# Patient Record
Sex: MALE | Race: WHITE | Employment: FULL TIME | URBAN - METROPOLITAN AREA
[De-identification: names, ages, dates, MRNs, and addresses within clinical notes are randomized per-mention and may not be internally consistent; named-entity substitution may affect disease eponyms.]

---

## 2018-02-12 PROBLEM — M48.02 CERVICAL STENOSIS OF SPINAL CANAL: Status: ACTIVE | Noted: 2018-02-12

## 2018-03-09 PROBLEM — M54.12 CERVICAL RADICULOPATHY: Status: ACTIVE | Noted: 2018-03-09

## 2018-03-09 PROBLEM — M54.2 NECK PAIN: Status: ACTIVE | Noted: 2018-03-09

## 2018-03-09 PROBLEM — M47.812 CERVICAL FACET JOINT SYNDROME: Status: ACTIVE | Noted: 2018-03-09

## 2018-03-09 PROBLEM — M50.30 DDD (DEGENERATIVE DISC DISEASE), CERVICAL: Status: ACTIVE | Noted: 2018-03-09

## 2018-03-12 ENCOUNTER — PRE-PROCEDURE TELEPHONE (OUTPATIENT)
Dept: OPERATING ROOM | Age: 58
End: 2018-03-12

## 2018-03-12 ENCOUNTER — HOSPITAL ENCOUNTER (OUTPATIENT)
Age: 58
Setting detail: OUTPATIENT SURGERY
Discharge: HOME | End: 2018-03-12
Attending: PAIN MEDICINE | Admitting: PAIN MEDICINE
Payer: COMMERCIAL

## 2018-03-12 ENCOUNTER — HOSPITAL ENCOUNTER (OUTPATIENT)
Dept: GENERAL RADIOLOGY | Age: 58
Discharge: HOME | End: 2018-03-14
Payer: COMMERCIAL

## 2018-03-12 VITALS
OXYGEN SATURATION: 95 % | RESPIRATION RATE: 16 BRPM | HEART RATE: 72 BPM | DIASTOLIC BLOOD PRESSURE: 88 MMHG | SYSTOLIC BLOOD PRESSURE: 140 MMHG

## 2018-03-12 DIAGNOSIS — M54.12 RIGHT CERVICAL RADICULOPATHY: ICD-10-CM

## 2018-03-12 PROCEDURE — 62321 NJX INTERLAMINAR CRV/THRC: CPT | Performed by: PAIN MEDICINE

## 2018-03-12 PROCEDURE — 2500000003 HC RX 250 WO HCPCS: Performed by: PAIN MEDICINE

## 2018-03-12 PROCEDURE — 77003 FLUOROGUIDE FOR SPINE INJECT: CPT

## 2018-03-12 PROCEDURE — 7100000010 HC PHASE II RECOVERY - FIRST 15 MIN: Performed by: PAIN MEDICINE

## 2018-03-12 PROCEDURE — 3600000005 HC SURGERY LEVEL 5 BASE: Performed by: PAIN MEDICINE

## 2018-03-12 PROCEDURE — 6360000002 HC RX W HCPCS: Performed by: PAIN MEDICINE

## 2018-03-12 PROCEDURE — 7100000011 HC PHASE II RECOVERY - ADDTL 15 MIN: Performed by: PAIN MEDICINE

## 2018-03-12 RX ORDER — LIDOCAINE HYDROCHLORIDE 5 MG/ML
INJECTION, SOLUTION INFILTRATION; INTRAVENOUS PRN
Status: DISCONTINUED | OUTPATIENT
Start: 2018-03-12 | End: 2018-03-12 | Stop reason: HOSPADM

## 2018-03-12 RX ORDER — METHYLPREDNISOLONE ACETATE 40 MG/ML
INJECTION, SUSPENSION INTRA-ARTICULAR; INTRALESIONAL; INTRAMUSCULAR; SOFT TISSUE PRN
Status: DISCONTINUED | OUTPATIENT
Start: 2018-03-12 | End: 2018-03-12 | Stop reason: HOSPADM

## 2018-03-12 ASSESSMENT — PAIN - FUNCTIONAL ASSESSMENT: PAIN_FUNCTIONAL_ASSESSMENT: 0-10

## 2018-03-12 ASSESSMENT — PAIN DESCRIPTION - DESCRIPTORS
DESCRIPTORS: ACHING

## 2018-03-12 ASSESSMENT — PAIN DESCRIPTION - PAIN TYPE
TYPE: CHRONIC PAIN
TYPE: CHRONIC PAIN

## 2018-03-12 ASSESSMENT — PAIN DESCRIPTION - LOCATION
LOCATION: BACK;NECK
LOCATION: BACK;NECK

## 2018-03-12 ASSESSMENT — PAIN SCALES - GENERAL
PAINLEVEL_OUTOF10: 4
PAINLEVEL_OUTOF10: 6

## 2018-03-12 NOTE — OP NOTE
fluoroscopy. Once in the epidural space , negative aspiration for blood and CSF was confirmed . Needle tip placement was confirmed by visualizing epidural spread of 0.5 ml of omnipaque 240 visualized in both AP and lateral live fluoroscopic views. Then after negative aspiration, a solution of 0.5 % Lidocaine 3 ml and 40 mg DepoMedrol was easily injected. The needle was gently removed intact. The patient neck was cleaned and a Band-Aid was placed over the needle insertion point. Disposition the patient tolerated the procedure well and there were no complications . Vital signs remained stable throughout the procedure. The patient was escorted to the recovery area where they remained until discharge and written discharge instructions for the procedure were given. Plan: Chandu Carter will return to our pain management center as scheduled.      Sandra Jackson MD

## 2018-03-12 NOTE — OP NOTE
The clinician documented this note on the incorrect encounter. This information has been reviewed and the note has been moved to the correct visit. Please contact local medical records if there are any questions.

## 2018-03-16 ENCOUNTER — TREATMENT (OUTPATIENT)
Dept: PHYSICAL THERAPY | Age: 58
End: 2018-03-16
Payer: COMMERCIAL

## 2018-03-16 DIAGNOSIS — M48.02 CERVICAL STENOSIS OF SPINAL CANAL: Primary | ICD-10-CM

## 2018-03-16 PROCEDURE — G0283 ELEC STIM OTHER THAN WOUND: HCPCS

## 2018-03-16 NOTE — PROGRESS NOTES
Physical Therapy Daily Treatment Note    Date: 3/16/2018  Patient Name: Nickolas Aranda  : 1960   MRN: [id number]  DOInjury: ~2017  DIAGNOSIS: Cervical stenosis of spinal canal   REFERRING PROVIDER: Norah Wood DO    S: Had a epidural on 3/12/18. Feeling about 70-75% improvement. O:    Time 7895-0280    Visit 10/18 G CODE EVERY 10 VISITS   Pain 10/10 Current:    Goal:  CI   ROM     Modalities     MH + IFC Seated x 20 min    IFC  UT area   Cervical traction      Exercise     ROWS: H     ROWS: M     ROWS: L     Shoulder ER     Shoulder IR     Shrugs                         A:  Tolerated well.      P: Continue with rehab plan  [place] [place] [address], PTA

## 2018-03-19 ENCOUNTER — TREATMENT (OUTPATIENT)
Dept: PHYSICAL THERAPY | Age: 58
End: 2018-03-19
Payer: COMMERCIAL

## 2018-03-19 DIAGNOSIS — M48.02 CERVICAL STENOSIS OF SPINAL CANAL: Primary | ICD-10-CM

## 2018-03-19 PROCEDURE — G0283 ELEC STIM OTHER THAN WOUND: HCPCS

## 2018-03-19 PROCEDURE — 97110 THERAPEUTIC EXERCISES: CPT

## 2018-12-19 ENCOUNTER — APPOINTMENT (OUTPATIENT)
Dept: GENERAL RADIOLOGY | Age: 58
End: 2018-12-19
Payer: COMMERCIAL

## 2018-12-19 ENCOUNTER — HOSPITAL ENCOUNTER (EMERGENCY)
Age: 58
Discharge: HOME | End: 2018-12-19
Payer: COMMERCIAL

## 2018-12-19 VITALS
WEIGHT: 232 LBS | TEMPERATURE: 98.7 F | OXYGEN SATURATION: 97 % | SYSTOLIC BLOOD PRESSURE: 159 MMHG | HEART RATE: 80 BPM | BODY MASS INDEX: 28.24 KG/M2 | RESPIRATION RATE: 16 BRPM | DIASTOLIC BLOOD PRESSURE: 53 MMHG

## 2018-12-19 DIAGNOSIS — J06.9 ACUTE UPPER RESPIRATORY INFECTION: Primary | ICD-10-CM

## 2018-12-19 DIAGNOSIS — J45.909 UNCOMPLICATED ASTHMA, UNSPECIFIED ASTHMA SEVERITY, UNSPECIFIED WHETHER PERSISTENT: ICD-10-CM

## 2018-12-19 PROCEDURE — 99212 OFFICE O/P EST SF 10 MIN: CPT

## 2018-12-19 PROCEDURE — 71046 X-RAY EXAM CHEST 2 VIEWS: CPT

## 2018-12-19 RX ORDER — AZITHROMYCIN 250 MG/1
TABLET, FILM COATED ORAL
Qty: 6 TABLET | Refills: 0 | Status: SHIPPED | OUTPATIENT
Start: 2018-12-19 | End: 2018-12-29

## 2018-12-19 ASSESSMENT — PAIN DESCRIPTION - LOCATION: LOCATION: FACE

## 2018-12-19 ASSESSMENT — PAIN DESCRIPTION - DESCRIPTORS: DESCRIPTORS: PRESSURE

## 2018-12-19 ASSESSMENT — PAIN SCALES - GENERAL: PAINLEVEL_OUTOF10: 7

## 2019-03-18 ENCOUNTER — HOSPITAL ENCOUNTER (EMERGENCY)
Age: 59
Discharge: HOME | End: 2019-03-18
Attending: EMERGENCY MEDICINE
Payer: COMMERCIAL

## 2019-03-18 ENCOUNTER — APPOINTMENT (OUTPATIENT)
Dept: GENERAL RADIOLOGY | Age: 59
End: 2019-03-18
Payer: COMMERCIAL

## 2019-03-18 VITALS
HEART RATE: 114 BPM | DIASTOLIC BLOOD PRESSURE: 72 MMHG | HEIGHT: 76 IN | OXYGEN SATURATION: 96 % | SYSTOLIC BLOOD PRESSURE: 121 MMHG | RESPIRATION RATE: 20 BRPM | TEMPERATURE: 98.6 F | BODY MASS INDEX: 29.59 KG/M2 | WEIGHT: 243 LBS

## 2019-03-18 DIAGNOSIS — J45.909 UNCOMPLICATED ASTHMA, UNSPECIFIED ASTHMA SEVERITY, UNSPECIFIED WHETHER PERSISTENT: ICD-10-CM

## 2019-03-18 DIAGNOSIS — J10.1 INFLUENZA A: Primary | ICD-10-CM

## 2019-03-18 DIAGNOSIS — R05.9 COUGH: ICD-10-CM

## 2019-03-18 LAB
ALBUMIN SERPL-MCNC: 4 G/DL (ref 3.5–5.2)
ALP BLD-CCNC: 91 U/L (ref 40–129)
ALT SERPL-CCNC: 24 U/L (ref 0–40)
ANION GAP SERPL CALCULATED.3IONS-SCNC: 12 MMOL/L (ref 7–16)
AST SERPL-CCNC: 20 U/L (ref 0–39)
BASOPHILS ABSOLUTE: 0.05 E9/L (ref 0–0.2)
BASOPHILS RELATIVE PERCENT: 0.4 % (ref 0–2)
BILIRUB SERPL-MCNC: 0.7 MG/DL (ref 0–1.2)
BUN BLDV-MCNC: 10 MG/DL (ref 6–20)
CALCIUM SERPL-MCNC: 9.3 MG/DL (ref 8.6–10.2)
CHLORIDE BLD-SCNC: 98 MMOL/L (ref 98–107)
CO2: 27 MMOL/L (ref 22–29)
CREAT SERPL-MCNC: 0.9 MG/DL (ref 0.7–1.2)
EOSINOPHILS ABSOLUTE: 0.24 E9/L (ref 0.05–0.5)
EOSINOPHILS RELATIVE PERCENT: 1.9 % (ref 0–6)
GFR AFRICAN AMERICAN: >60
GFR NON-AFRICAN AMERICAN: >60 ML/MIN/1.73
GLUCOSE BLD-MCNC: 108 MG/DL (ref 74–99)
HCT VFR BLD CALC: 52.2 % (ref 37–54)
HEMOGLOBIN: 17.1 G/DL (ref 12.5–16.5)
IMMATURE GRANULOCYTES #: 0.18 E9/L
IMMATURE GRANULOCYTES %: 1.4 % (ref 0–5)
INFLUENZA A BY PCR: DETECTED
INFLUENZA B BY PCR: NOT DETECTED
LACTIC ACID: 1.8 MMOL/L (ref 0.5–2.2)
LYMPHOCYTES ABSOLUTE: 1.05 E9/L (ref 1.5–4)
LYMPHOCYTES RELATIVE PERCENT: 8.3 % (ref 20–42)
MCH RBC QN AUTO: 29.9 PG (ref 26–35)
MCHC RBC AUTO-ENTMCNC: 32.8 % (ref 32–34.5)
MCV RBC AUTO: 91.3 FL (ref 80–99.9)
MONOCYTES ABSOLUTE: 1.03 E9/L (ref 0.1–0.95)
MONOCYTES RELATIVE PERCENT: 8.1 % (ref 2–12)
NEUTROPHILS ABSOLUTE: 10.14 E9/L (ref 1.8–7.3)
NEUTROPHILS RELATIVE PERCENT: 79.9 % (ref 43–80)
PDW BLD-RTO: 12.7 FL (ref 11.5–15)
PLATELET # BLD: 208 E9/L (ref 130–450)
PMV BLD AUTO: 9.4 FL (ref 7–12)
POTASSIUM SERPL-SCNC: 4.1 MMOL/L (ref 3.5–5)
RBC # BLD: 5.72 E12/L (ref 3.8–5.8)
SODIUM BLD-SCNC: 137 MMOL/L (ref 132–146)
TOTAL PROTEIN: 7.3 G/DL (ref 6.4–8.3)
TROPONIN: <0.01 NG/ML (ref 0–0.03)
WBC # BLD: 12.7 E9/L (ref 4.5–11.5)

## 2019-03-18 PROCEDURE — 2580000003 HC RX 258: Performed by: EMERGENCY MEDICINE

## 2019-03-18 PROCEDURE — 71046 X-RAY EXAM CHEST 2 VIEWS: CPT

## 2019-03-18 PROCEDURE — 80053 COMPREHEN METABOLIC PANEL: CPT

## 2019-03-18 PROCEDURE — 84484 ASSAY OF TROPONIN QUANT: CPT

## 2019-03-18 PROCEDURE — 6370000000 HC RX 637 (ALT 250 FOR IP): Performed by: EMERGENCY MEDICINE

## 2019-03-18 PROCEDURE — 94640 AIRWAY INHALATION TREATMENT: CPT

## 2019-03-18 PROCEDURE — 99284 EMERGENCY DEPT VISIT MOD MDM: CPT

## 2019-03-18 PROCEDURE — 6360000002 HC RX W HCPCS: Performed by: EMERGENCY MEDICINE

## 2019-03-18 PROCEDURE — 93005 ELECTROCARDIOGRAM TRACING: CPT | Performed by: EMERGENCY MEDICINE

## 2019-03-18 PROCEDURE — 96374 THER/PROPH/DIAG INJ IV PUSH: CPT

## 2019-03-18 PROCEDURE — 36415 COLL VENOUS BLD VENIPUNCTURE: CPT

## 2019-03-18 PROCEDURE — 87502 INFLUENZA DNA AMP PROBE: CPT

## 2019-03-18 PROCEDURE — 85025 COMPLETE CBC W/AUTO DIFF WBC: CPT

## 2019-03-18 PROCEDURE — 83605 ASSAY OF LACTIC ACID: CPT

## 2019-03-18 RX ORDER — OSELTAMIVIR PHOSPHATE 75 MG/1
75 CAPSULE ORAL ONCE
Status: COMPLETED | OUTPATIENT
Start: 2019-03-18 | End: 2019-03-18

## 2019-03-18 RX ORDER — IBUPROFEN 200 MG
600 TABLET ORAL 4 TIMES DAILY
COMMUNITY
End: 2022-03-16

## 2019-03-18 RX ORDER — METHYLPREDNISOLONE SODIUM SUCCINATE 125 MG/2ML
125 INJECTION, POWDER, LYOPHILIZED, FOR SOLUTION INTRAMUSCULAR; INTRAVENOUS ONCE
Status: COMPLETED | OUTPATIENT
Start: 2019-03-18 | End: 2019-03-18

## 2019-03-18 RX ORDER — IPRATROPIUM BROMIDE AND ALBUTEROL SULFATE 2.5; .5 MG/3ML; MG/3ML
1 SOLUTION RESPIRATORY (INHALATION) ONCE
Status: COMPLETED | OUTPATIENT
Start: 2019-03-18 | End: 2019-03-18

## 2019-03-18 RX ORDER — OSELTAMIVIR PHOSPHATE 75 MG/1
75 CAPSULE ORAL 2 TIMES DAILY
Qty: 10 CAPSULE | Refills: 0 | Status: SHIPPED | OUTPATIENT
Start: 2019-03-18 | End: 2019-03-23

## 2019-03-18 RX ORDER — PREDNISONE 10 MG/1
TABLET ORAL
Qty: 20 TABLET | Refills: 0 | Status: SHIPPED | OUTPATIENT
Start: 2019-03-18 | End: 2019-03-28

## 2019-03-18 RX ORDER — M-VIT,TX,IRON,MINS/CALC/FOLIC 27MG-0.4MG
1 TABLET ORAL DAILY
COMMUNITY
End: 2021-08-02 | Stop reason: ALTCHOICE

## 2019-03-18 RX ORDER — 0.9 % SODIUM CHLORIDE 0.9 %
1000 INTRAVENOUS SOLUTION INTRAVENOUS ONCE
Status: COMPLETED | OUTPATIENT
Start: 2019-03-18 | End: 2019-03-18

## 2019-03-18 RX ADMIN — METHYLPREDNISOLONE SODIUM SUCCINATE 125 MG: 125 INJECTION, POWDER, FOR SOLUTION INTRAMUSCULAR; INTRAVENOUS at 15:27

## 2019-03-18 RX ADMIN — SODIUM CHLORIDE 1000 ML: 9 INJECTION, SOLUTION INTRAVENOUS at 15:27

## 2019-03-18 RX ADMIN — IPRATROPIUM BROMIDE AND ALBUTEROL SULFATE 1 AMPULE: .5; 3 SOLUTION RESPIRATORY (INHALATION) at 15:02

## 2019-03-18 RX ADMIN — OSELTAMIVIR PHOSPHATE 75 MG: 75 CAPSULE ORAL at 16:26

## 2019-03-19 LAB
EKG ATRIAL RATE: 118 BPM
EKG P AXIS: 24 DEGREES
EKG P-R INTERVAL: 138 MS
EKG Q-T INTERVAL: 324 MS
EKG QRS DURATION: 96 MS
EKG QTC CALCULATION (BAZETT): 454 MS
EKG R AXIS: -48 DEGREES
EKG T AXIS: 40 DEGREES
EKG VENTRICULAR RATE: 118 BPM

## 2019-03-21 ENCOUNTER — HOSPITAL ENCOUNTER (EMERGENCY)
Age: 59
Discharge: HOME | End: 2019-03-21
Attending: EMERGENCY MEDICINE
Payer: COMMERCIAL

## 2019-03-21 ENCOUNTER — APPOINTMENT (OUTPATIENT)
Dept: CT IMAGING | Age: 59
End: 2019-03-21
Payer: COMMERCIAL

## 2019-03-21 VITALS
DIASTOLIC BLOOD PRESSURE: 97 MMHG | SYSTOLIC BLOOD PRESSURE: 152 MMHG | RESPIRATION RATE: 16 BRPM | BODY MASS INDEX: 29.59 KG/M2 | HEART RATE: 92 BPM | TEMPERATURE: 98.4 F | WEIGHT: 243 LBS | HEIGHT: 76 IN | OXYGEN SATURATION: 96 %

## 2019-03-21 DIAGNOSIS — E87.6 HYPOKALEMIA: ICD-10-CM

## 2019-03-21 DIAGNOSIS — K11.20 PAROTITIS: Primary | ICD-10-CM

## 2019-03-21 LAB
ANION GAP SERPL CALCULATED.3IONS-SCNC: 12 MMOL/L (ref 7–16)
BASOPHILS ABSOLUTE: 0.05 E9/L (ref 0–0.2)
BASOPHILS RELATIVE PERCENT: 0.5 % (ref 0–2)
BUN BLDV-MCNC: 14 MG/DL (ref 6–20)
CALCIUM SERPL-MCNC: 9.5 MG/DL (ref 8.6–10.2)
CHLORIDE BLD-SCNC: 104 MMOL/L (ref 98–107)
CO2: 27 MMOL/L (ref 22–29)
CO2: 28 MMOL/L (ref 22–29)
CREAT SERPL-MCNC: 0.8 MG/DL (ref 0.7–1.2)
EOSINOPHILS ABSOLUTE: 0.22 E9/L (ref 0.05–0.5)
EOSINOPHILS RELATIVE PERCENT: 2.1 % (ref 0–6)
GFR AFRICAN AMERICAN: >60
GFR AFRICAN AMERICAN: >60
GFR NON-AFRICAN AMERICAN: >60 ML/MIN/1.73
GFR NON-AFRICAN AMERICAN: >60 ML/MIN/1.73
GLUCOSE BLD-MCNC: 77 MG/DL (ref 74–99)
GLUCOSE BLD-MCNC: 85 MG/DL (ref 74–99)
HCT VFR BLD CALC: 49.8 % (ref 37–54)
HEMOGLOBIN: 16.3 G/DL (ref 12.5–16.5)
IMMATURE GRANULOCYTES #: 0.13 E9/L
IMMATURE GRANULOCYTES %: 1.2 % (ref 0–5)
LACTIC ACID: 1.9 MMOL/L (ref 0.5–2.2)
LYMPHOCYTES ABSOLUTE: 3.04 E9/L (ref 1.5–4)
LYMPHOCYTES RELATIVE PERCENT: 29 % (ref 20–42)
MCH RBC QN AUTO: 30 PG (ref 26–35)
MCHC RBC AUTO-ENTMCNC: 32.7 % (ref 32–34.5)
MCV RBC AUTO: 91.5 FL (ref 80–99.9)
MONOCYTES ABSOLUTE: 1.2 E9/L (ref 0.1–0.95)
MONOCYTES RELATIVE PERCENT: 11.4 % (ref 2–12)
NEUTROPHILS ABSOLUTE: 5.86 E9/L (ref 1.8–7.3)
NEUTROPHILS RELATIVE PERCENT: 55.8 % (ref 43–80)
PDW BLD-RTO: 12.8 FL (ref 11.5–15)
PLATELET # BLD: 217 E9/L (ref 130–450)
PMV BLD AUTO: 9.5 FL (ref 7–12)
POC ANION GAP: 17 MMOL/L (ref 7–16)
POC BUN: 15 MG/DL (ref 8–23)
POC CHLORIDE: 99 MMOL/L (ref 100–108)
POC CREATININE: 0.8 MG/DL (ref 0.7–1.2)
POC POTASSIUM: 2.9 MMOL/L (ref 3.5–5)
POC SODIUM: 143 MMOL/L (ref 132–146)
POTASSIUM SERPL-SCNC: 3 MMOL/L (ref 3.5–5)
RBC # BLD: 5.44 E12/L (ref 3.8–5.8)
SODIUM BLD-SCNC: 144 MMOL/L (ref 132–146)
WBC # BLD: 10.5 E9/L (ref 4.5–11.5)

## 2019-03-21 PROCEDURE — 2500000003 HC RX 250 WO HCPCS: Performed by: PHYSICIAN ASSISTANT

## 2019-03-21 PROCEDURE — 6360000002 HC RX W HCPCS: Performed by: PHYSICIAN ASSISTANT

## 2019-03-21 PROCEDURE — 6370000000 HC RX 637 (ALT 250 FOR IP): Performed by: PHYSICIAN ASSISTANT

## 2019-03-21 PROCEDURE — 96375 TX/PRO/DX INJ NEW DRUG ADDON: CPT

## 2019-03-21 PROCEDURE — 82565 ASSAY OF CREATININE: CPT

## 2019-03-21 PROCEDURE — 84520 ASSAY OF UREA NITROGEN: CPT

## 2019-03-21 PROCEDURE — 80048 BASIC METABOLIC PNL TOTAL CA: CPT

## 2019-03-21 PROCEDURE — 83605 ASSAY OF LACTIC ACID: CPT

## 2019-03-21 PROCEDURE — 6360000004 HC RX CONTRAST MEDICATION: Performed by: RADIOLOGY

## 2019-03-21 PROCEDURE — 96367 TX/PROPH/DG ADDL SEQ IV INF: CPT

## 2019-03-21 PROCEDURE — 36415 COLL VENOUS BLD VENIPUNCTURE: CPT

## 2019-03-21 PROCEDURE — 85025 COMPLETE CBC W/AUTO DIFF WBC: CPT

## 2019-03-21 PROCEDURE — 94640 AIRWAY INHALATION TREATMENT: CPT

## 2019-03-21 PROCEDURE — 99284 EMERGENCY DEPT VISIT MOD MDM: CPT

## 2019-03-21 PROCEDURE — 2580000003 HC RX 258: Performed by: PHYSICIAN ASSISTANT

## 2019-03-21 PROCEDURE — 96365 THER/PROPH/DIAG IV INF INIT: CPT

## 2019-03-21 PROCEDURE — 80051 ELECTROLYTE PANEL: CPT

## 2019-03-21 PROCEDURE — 70491 CT SOFT TISSUE NECK W/DYE: CPT

## 2019-03-21 PROCEDURE — 96366 THER/PROPH/DIAG IV INF ADDON: CPT

## 2019-03-21 PROCEDURE — 82947 ASSAY GLUCOSE BLOOD QUANT: CPT

## 2019-03-21 RX ORDER — IPRATROPIUM BROMIDE AND ALBUTEROL SULFATE 2.5; .5 MG/3ML; MG/3ML
1 SOLUTION RESPIRATORY (INHALATION) ONCE
Status: COMPLETED | OUTPATIENT
Start: 2019-03-21 | End: 2019-03-21

## 2019-03-21 RX ORDER — 0.9 % SODIUM CHLORIDE 0.9 %
1000 INTRAVENOUS SOLUTION INTRAVENOUS ONCE
Status: COMPLETED | OUTPATIENT
Start: 2019-03-21 | End: 2019-03-21

## 2019-03-21 RX ORDER — KETOROLAC TROMETHAMINE 15 MG/ML
15 INJECTION, SOLUTION INTRAMUSCULAR; INTRAVENOUS ONCE
Status: COMPLETED | OUTPATIENT
Start: 2019-03-21 | End: 2019-03-21

## 2019-03-21 RX ORDER — POTASSIUM CHLORIDE 20 MEQ/1
20 TABLET, EXTENDED RELEASE ORAL DAILY
Qty: 7 TABLET | Refills: 0 | Status: SHIPPED | OUTPATIENT
Start: 2019-03-21 | End: 2021-08-02 | Stop reason: ALTCHOICE

## 2019-03-21 RX ORDER — POTASSIUM CHLORIDE 20 MEQ/1
40 TABLET, EXTENDED RELEASE ORAL ONCE
Status: COMPLETED | OUTPATIENT
Start: 2019-03-21 | End: 2019-03-21

## 2019-03-21 RX ORDER — AMOXICILLIN AND CLAVULANATE POTASSIUM 875; 125 MG/1; MG/1
1 TABLET, FILM COATED ORAL 2 TIMES DAILY
Qty: 20 TABLET | Refills: 0 | Status: SHIPPED | OUTPATIENT
Start: 2019-03-21 | End: 2019-03-31

## 2019-03-21 RX ORDER — CLINDAMYCIN PHOSPHATE 600 MG/50ML
600 INJECTION INTRAVENOUS ONCE
Status: COMPLETED | OUTPATIENT
Start: 2019-03-21 | End: 2019-03-21

## 2019-03-21 RX ORDER — POTASSIUM CHLORIDE 7.45 MG/ML
10 INJECTION INTRAVENOUS ONCE
Status: COMPLETED | OUTPATIENT
Start: 2019-03-21 | End: 2019-03-21

## 2019-03-21 RX ORDER — DEXAMETHASONE SODIUM PHOSPHATE 10 MG/ML
10 INJECTION INTRAMUSCULAR; INTRAVENOUS ONCE
Status: COMPLETED | OUTPATIENT
Start: 2019-03-21 | End: 2019-03-21

## 2019-03-21 RX ADMIN — IOPAMIDOL 80 ML: 755 INJECTION, SOLUTION INTRAVENOUS at 09:54

## 2019-03-21 RX ADMIN — KETOROLAC TROMETHAMINE 15 MG: 15 INJECTION, SOLUTION INTRAMUSCULAR; INTRAVENOUS at 09:24

## 2019-03-21 RX ADMIN — CLINDAMYCIN PHOSPHATE 600 MG: 600 INJECTION, SOLUTION INTRAVENOUS at 10:43

## 2019-03-21 RX ADMIN — SODIUM CHLORIDE 1000 ML: 9 INJECTION, SOLUTION INTRAVENOUS at 09:23

## 2019-03-21 RX ADMIN — SODIUM CHLORIDE 1000 ML: 9 INJECTION, SOLUTION INTRAVENOUS at 12:05

## 2019-03-21 RX ADMIN — IPRATROPIUM BROMIDE AND ALBUTEROL SULFATE 1 AMPULE: .5; 3 SOLUTION RESPIRATORY (INHALATION) at 09:16

## 2019-03-21 RX ADMIN — DEXAMETHASONE SODIUM PHOSPHATE 10 MG: 10 INJECTION INTRAMUSCULAR; INTRAVENOUS at 09:24

## 2019-03-21 RX ADMIN — POTASSIUM CHLORIDE 40 MEQ: 20 TABLET, EXTENDED RELEASE ORAL at 10:40

## 2019-03-21 RX ADMIN — POTASSIUM CHLORIDE 10 MEQ: 7.46 INJECTION, SOLUTION INTRAVENOUS at 12:05

## 2019-03-21 ASSESSMENT — PAIN DESCRIPTION - PAIN TYPE: TYPE: ACUTE PAIN

## 2019-03-21 ASSESSMENT — PAIN SCALES - GENERAL
PAINLEVEL_OUTOF10: 8
PAINLEVEL_OUTOF10: 8

## 2019-03-21 ASSESSMENT — PAIN DESCRIPTION - ORIENTATION: ORIENTATION: LEFT

## 2019-03-21 ASSESSMENT — PAIN DESCRIPTION - LOCATION: LOCATION: FACE

## 2019-03-25 ENCOUNTER — HOSPITAL ENCOUNTER (OUTPATIENT)
Age: 59
Discharge: HOME | End: 2019-03-25
Payer: COMMERCIAL

## 2019-03-25 LAB — POTASSIUM SERPL-SCNC: 3.5 MMOL/L (ref 3.5–5)

## 2019-03-25 PROCEDURE — 84132 ASSAY OF SERUM POTASSIUM: CPT

## 2019-03-25 PROCEDURE — 86703 HIV-1/HIV-2 1 RESULT ANTBDY: CPT

## 2019-03-25 PROCEDURE — 36415 COLL VENOUS BLD VENIPUNCTURE: CPT

## 2019-03-26 LAB — HIV-1 AND HIV-2 ANTIBODIES: NORMAL

## 2020-04-07 ENCOUNTER — HOSPITAL ENCOUNTER (EMERGENCY)
Age: 60
Discharge: HOME | End: 2020-04-07
Payer: COMMERCIAL

## 2020-04-07 VITALS
RESPIRATION RATE: 20 BRPM | DIASTOLIC BLOOD PRESSURE: 84 MMHG | BODY MASS INDEX: 30.92 KG/M2 | HEART RATE: 69 BPM | TEMPERATURE: 98.1 F | OXYGEN SATURATION: 96 % | WEIGHT: 254 LBS | SYSTOLIC BLOOD PRESSURE: 126 MMHG

## 2020-04-07 PROCEDURE — 96372 THER/PROPH/DIAG INJ SC/IM: CPT

## 2020-04-07 PROCEDURE — 99213 OFFICE O/P EST LOW 20 MIN: CPT

## 2020-04-07 PROCEDURE — 2500000003 HC RX 250 WO HCPCS: Performed by: PHYSICIAN ASSISTANT

## 2020-04-07 PROCEDURE — 12001 RPR S/N/AX/GEN/TRNK 2.5CM/<: CPT

## 2020-04-07 RX ORDER — CEPHALEXIN 500 MG/1
500 CAPSULE ORAL 2 TIMES DAILY
Qty: 10 CAPSULE | Refills: 0 | Status: SHIPPED | OUTPATIENT
Start: 2020-04-07 | End: 2020-04-12

## 2020-04-07 RX ORDER — LIDOCAINE HYDROCHLORIDE 10 MG/ML
5 INJECTION, SOLUTION INFILTRATION; PERINEURAL ONCE
Status: COMPLETED | OUTPATIENT
Start: 2020-04-07 | End: 2020-04-07

## 2020-04-07 RX ADMIN — LIDOCAINE HYDROCHLORIDE 5 ML: 10 INJECTION, SOLUTION INFILTRATION; PERINEURAL at 16:33

## 2020-04-07 ASSESSMENT — PAIN SCALES - GENERAL
PAINLEVEL_OUTOF10: 6
PAINLEVEL_OUTOF10: 6

## 2020-04-07 ASSESSMENT — PAIN DESCRIPTION - LOCATION: LOCATION: HEAD

## 2020-04-07 ASSESSMENT — PAIN DESCRIPTION - ORIENTATION: ORIENTATION: RIGHT

## 2020-04-07 ASSESSMENT — PAIN DESCRIPTION - PAIN TYPE: TYPE: ACUTE PAIN

## 2020-04-07 NOTE — ED PROVIDER NOTES
Abdomen is soft. Abdomen is not rigid. Tenderness: There is no abdominal tenderness. There is no guarding or rebound. Skin:     General: Skin is warm and dry. Findings: No abrasion or rash. Neurological:      Mental Status: He is alert and oriented to person, place, and time. GCS: GCS eye subscore is 4. GCS verbal subscore is 5. GCS motor subscore is 6. Cranial Nerves: No cranial nerve deficit. Sensory: No sensory deficit. Coordination: Coordination normal.      Gait: Gait normal.         Lac Repair  Performed by: Cassie Herron PA-C  Authorized by: Cassie Herron PA-C     Consent:     Consent obtained:  Verbal    Consent given by:  Patient    Risks discussed:  Infection    Alternatives discussed:  No treatment  Anesthesia (see MAR for exact dosages): Anesthesia method:  Local infiltration    Local anesthetic:  Lidocaine 1% w/o epi  Laceration details:     Location: forehead. Length (cm):  2    Depth (mm):  1  Repair type:     Repair type: Intermediate  Pre-procedure details:     Preparation:  Patient was prepped and draped in usual sterile fashion  Exploration:     Hemostasis achieved with:  Direct pressure    Wound exploration: wound explored through full range of motion      Wound extent: no areolar tissue violation noted, no fascia violation noted, no foreign bodies/material noted, no muscle damage noted, no nerve damage noted, no tendon damage noted, no underlying fracture noted and no vascular damage noted    Treatment:     Area cleansed with:  Saline    Amount of cleaning:  Standard    Irrigation solution:  Sterile saline  Skin repair:     Repair method:  Sutures    Suture size: 5-0 vicryl and 6-0 ethilon. Wound skin closure material used: vicryl.     Suture technique:  Simple interrupted    Number of sutures: 8 (2 are Subcutaneous)  Approximation:     Approximation:  Close  Post-procedure details:     Dressing:  Antibiotic ointment and non-adherent dressing

## 2020-10-27 ENCOUNTER — HOSPITAL ENCOUNTER (EMERGENCY)
Age: 60
Discharge: HOME | End: 2020-10-27
Attending: EMERGENCY MEDICINE
Payer: COMMERCIAL

## 2020-10-27 ENCOUNTER — APPOINTMENT (OUTPATIENT)
Dept: GENERAL RADIOLOGY | Age: 60
End: 2020-10-27
Payer: COMMERCIAL

## 2020-10-27 VITALS
WEIGHT: 250 LBS | OXYGEN SATURATION: 95 % | TEMPERATURE: 98.3 F | RESPIRATION RATE: 20 BRPM | BODY MASS INDEX: 30.44 KG/M2 | DIASTOLIC BLOOD PRESSURE: 85 MMHG | SYSTOLIC BLOOD PRESSURE: 125 MMHG | HEART RATE: 82 BPM | HEIGHT: 76 IN

## 2020-10-27 LAB
ALBUMIN SERPL-MCNC: 4 G/DL (ref 3.5–5.2)
ALP BLD-CCNC: 91 U/L (ref 40–129)
ALT SERPL-CCNC: 15 U/L (ref 0–40)
ANION GAP SERPL CALCULATED.3IONS-SCNC: 13 MMOL/L (ref 7–16)
AST SERPL-CCNC: 14 U/L (ref 0–39)
BASOPHILS ABSOLUTE: 0.05 E9/L (ref 0–0.2)
BASOPHILS RELATIVE PERCENT: 0.5 % (ref 0–2)
BILIRUB SERPL-MCNC: 0.4 MG/DL (ref 0–1.2)
BUN BLDV-MCNC: 15 MG/DL (ref 6–20)
CALCIUM SERPL-MCNC: 9.9 MG/DL (ref 8.6–10.2)
CHLORIDE BLD-SCNC: 99 MMOL/L (ref 98–107)
CO2: 25 MMOL/L (ref 22–29)
CREAT SERPL-MCNC: 0.9 MG/DL (ref 0.7–1.2)
EOSINOPHILS ABSOLUTE: 0.29 E9/L (ref 0.05–0.5)
EOSINOPHILS RELATIVE PERCENT: 2.8 % (ref 0–6)
GFR AFRICAN AMERICAN: >60
GFR NON-AFRICAN AMERICAN: >60 ML/MIN/1.73
GLUCOSE BLD-MCNC: 104 MG/DL (ref 74–99)
HCT VFR BLD CALC: 50.2 % (ref 37–54)
HEMOGLOBIN: 16.6 G/DL (ref 12.5–16.5)
IMMATURE GRANULOCYTES #: 0.12 E9/L
IMMATURE GRANULOCYTES %: 1.2 % (ref 0–5)
LIPASE: 25 U/L (ref 13–60)
LYMPHOCYTES ABSOLUTE: 2.02 E9/L (ref 1.5–4)
LYMPHOCYTES RELATIVE PERCENT: 19.4 % (ref 20–42)
MCH RBC QN AUTO: 28.6 PG (ref 26–35)
MCHC RBC AUTO-ENTMCNC: 33.1 % (ref 32–34.5)
MCV RBC AUTO: 86.6 FL (ref 80–99.9)
MONOCYTES ABSOLUTE: 1.24 E9/L (ref 0.1–0.95)
MONOCYTES RELATIVE PERCENT: 11.9 % (ref 2–12)
NEUTROPHILS ABSOLUTE: 6.67 E9/L (ref 1.8–7.3)
NEUTROPHILS RELATIVE PERCENT: 64.2 % (ref 43–80)
PDW BLD-RTO: 13.8 FL (ref 11.5–15)
PLATELET # BLD: 298 E9/L (ref 130–450)
PMV BLD AUTO: 9.8 FL (ref 7–12)
POTASSIUM REFLEX MAGNESIUM: 3.8 MMOL/L (ref 3.5–5)
RBC # BLD: 5.8 E12/L (ref 3.8–5.8)
SODIUM BLD-SCNC: 137 MMOL/L (ref 132–146)
TOTAL PROTEIN: 6.9 G/DL (ref 6.4–8.3)
TROPONIN: <0.01 NG/ML (ref 0–0.03)
WBC # BLD: 10.4 E9/L (ref 4.5–11.5)

## 2020-10-27 PROCEDURE — 2580000003 HC RX 258: Performed by: STUDENT IN AN ORGANIZED HEALTH CARE EDUCATION/TRAINING PROGRAM

## 2020-10-27 PROCEDURE — 99283 EMERGENCY DEPT VISIT LOW MDM: CPT

## 2020-10-27 PROCEDURE — 84484 ASSAY OF TROPONIN QUANT: CPT

## 2020-10-27 PROCEDURE — 71045 X-RAY EXAM CHEST 1 VIEW: CPT

## 2020-10-27 PROCEDURE — U0003 INFECTIOUS AGENT DETECTION BY NUCLEIC ACID (DNA OR RNA); SEVERE ACUTE RESPIRATORY SYNDROME CORONAVIRUS 2 (SARS-COV-2) (CORONAVIRUS DISEASE [COVID-19]), AMPLIFIED PROBE TECHNIQUE, MAKING USE OF HIGH THROUGHPUT TECHNOLOGIES AS DESCRIBED BY CMS-2020-01-R: HCPCS

## 2020-10-27 PROCEDURE — 6370000000 HC RX 637 (ALT 250 FOR IP): Performed by: STUDENT IN AN ORGANIZED HEALTH CARE EDUCATION/TRAINING PROGRAM

## 2020-10-27 PROCEDURE — 83690 ASSAY OF LIPASE: CPT

## 2020-10-27 PROCEDURE — 93005 ELECTROCARDIOGRAM TRACING: CPT | Performed by: STUDENT IN AN ORGANIZED HEALTH CARE EDUCATION/TRAINING PROGRAM

## 2020-10-27 PROCEDURE — 85025 COMPLETE CBC W/AUTO DIFF WBC: CPT

## 2020-10-27 PROCEDURE — 94640 AIRWAY INHALATION TREATMENT: CPT

## 2020-10-27 PROCEDURE — 80053 COMPREHEN METABOLIC PANEL: CPT

## 2020-10-27 RX ORDER — IPRATROPIUM BROMIDE AND ALBUTEROL SULFATE 2.5; .5 MG/3ML; MG/3ML
1 SOLUTION RESPIRATORY (INHALATION)
Status: DISCONTINUED | OUTPATIENT
Start: 2020-10-27 | End: 2020-10-27

## 2020-10-27 RX ORDER — 0.9 % SODIUM CHLORIDE 0.9 %
1000 INTRAVENOUS SOLUTION INTRAVENOUS ONCE
Status: COMPLETED | OUTPATIENT
Start: 2020-10-27 | End: 2020-10-27

## 2020-10-27 RX ADMIN — IPRATROPIUM BROMIDE AND ALBUTEROL SULFATE 1 AMPULE: .5; 3 SOLUTION RESPIRATORY (INHALATION) at 17:15

## 2020-10-27 RX ADMIN — SODIUM CHLORIDE 1000 ML: 9 INJECTION, SOLUTION INTRAVENOUS at 18:13

## 2020-10-27 ASSESSMENT — ENCOUNTER SYMPTOMS
ABDOMINAL PAIN: 1
SINUS PRESSURE: 0
VOMITING: 0
WHEEZING: 1
BACK PAIN: 0
RHINORRHEA: 1
DIARRHEA: 1
EYE PAIN: 0
SORE THROAT: 1
COUGH: 0
NAUSEA: 0
SHORTNESS OF BREATH: 1

## 2020-10-27 ASSESSMENT — PAIN DESCRIPTION - DESCRIPTORS: DESCRIPTORS: ACHING;DISCOMFORT

## 2020-10-27 ASSESSMENT — PAIN DESCRIPTION - LOCATION: LOCATION: ABDOMEN

## 2020-10-27 ASSESSMENT — PAIN SCALES - GENERAL: PAINLEVEL_OUTOF10: 5

## 2020-10-27 ASSESSMENT — PAIN DESCRIPTION - FREQUENCY: FREQUENCY: INTERMITTENT

## 2020-10-27 NOTE — ED PROVIDER NOTES
Patient is a 60-year-old male with a history of asthma presenting to emergency department for shortness of breath since Friday, along with some abdominal pain in the epigastric region and some diarrhea, he said he has been having 4-5 bowel movements a day. He is having 5 out of 10 crampy-like abdominal pain, nothing makes it better palpation makes it worse, he is not try anything to help alleviate it. He says he has been using his inhaler more often because he felt short of breath, and has been wheezing more at nighttime. He denies any fevers, chills, chest pain, chest tightness, says nothing feels weak, tingly, or numb. He does note he has a headache, little bit of a sore throat, and occasionally stuffy nose. Denies any sick contacts. Review of Systems   Constitutional: Negative for chills and fever. HENT: Positive for rhinorrhea and sore throat. Negative for ear pain and sinus pressure. Eyes: Negative for pain. Respiratory: Positive for shortness of breath and wheezing. Negative for cough. Cardiovascular: Negative for chest pain. Gastrointestinal: Positive for abdominal pain and diarrhea. Negative for nausea and vomiting. Genitourinary: Negative for dysuria and frequency. Musculoskeletal: Negative for arthralgias and back pain. Skin: Negative for rash. Neurological: Negative for weakness and headaches. Hematological: Negative for adenopathy. All other systems reviewed and are negative. Physical Exam  Vitals signs and nursing note reviewed. Constitutional:       Appearance: He is well-developed. HENT:      Head: Normocephalic and atraumatic. Eyes:      Conjunctiva/sclera: Conjunctivae normal.   Neck:      Musculoskeletal: Normal range of motion and neck supple. Cardiovascular:      Rate and Rhythm: Normal rate and regular rhythm. Heart sounds: Normal heart sounds. No murmur. Pulmonary:      Effort: Pulmonary effort is normal. No respiratory distress. Breath sounds: Normal breath sounds. No wheezing or rales. Comments: No wheeziness throughout the lung fields, despite feeling short of breath  Abdominal:      General: Bowel sounds are normal.      Palpations: Abdomen is soft. Tenderness: There is abdominal tenderness (Epigastric tenderness, mild discomfort to palpation.) in the epigastric area. There is no guarding or rebound. Musculoskeletal:         General: No tenderness or deformity. Skin:     General: Skin is warm and dry. Neurological:      Mental Status: He is alert and oriented to person, place, and time. Cranial Nerves: No cranial nerve deficit. Coordination: Coordination normal.          Procedures     MDM     ED Course as of Oct 27 2144   Tue Oct 27, 2020   1817 EKG: This EKG is signed by emergency department physician. Rate: 86  Rhythm: Sinus and with occasional PVCs  Interpretation: non-specific EKG  Comparison: changes compared to previous EKG sinus rhythm with PVCs, ST flattening versus inversions noted in V1        [JG]   1907 Mild prominence of cardiopulmonary border noted on chest x-ray, hemoglobin was 16.6, likely a little bit fluid contracted gave a liter bolus, the wise lab work within normal limits. Will discharge patient home with an outpatient Covid test.  Return precautions given. Instructed to follow-up with his PCP. [JG]      ED Course User Index  [JG] Ashley Lewis MD      Asked the patient for an outpatient Covid test, instructed him to stay off work for the next 2 days and give a work excuse for the previous 2 days he has missed, instructed him to quarantine if he is positive for the next 14 days. ED Course as of Oct 27 2144   Tue Oct 27, 2020   1817 EKG: This EKG is signed by emergency department physician.     Rate: 86  Rhythm: Sinus and with occasional PVCs  Interpretation: non-specific EKG  Comparison: changes compared to previous EKG sinus rhythm with PVCs, ST flattening versus inversions noted in V1        [J]   1907 Mild prominence of cardiopulmonary border noted on chest x-ray, hemoglobin was 16.6, likely a little bit fluid contracted gave a liter bolus, the wise lab work within normal limits. Will discharge patient home with an outpatient Covid test.  Return precautions given. Instructed to follow-up with his PCP. [J]      ED Course User Index  [] Dulce Batista MD       --------------------------------------------- PAST HISTORY ---------------------------------------------  Past Medical History:  has a past medical history of Acid reflux, Asthma, Back pain, DDD (degenerative disc disease), cervical, Migraines, and Scoliosis. Past Surgical History:  has a past surgical history that includes Appendectomy; hernia repair; Cholecystectomy; Colonoscopy; Endoscopy, colon, diagnostic; Nerve Block (Right, 03/12/2018); and pr cecil nose/cleft lip/tip (Right, 3/12/2018). Social History:  reports that he has never smoked. He has never used smokeless tobacco. He reports that he does not drink alcohol or use drugs. Family History: family history includes Cancer in his brother and paternal grandmother; High Blood Pressure in his father. The patients home medications have been reviewed. Allergies: Patient has no known allergies.     -------------------------------------------------- RESULTS -------------------------------------------------  Labs:  Results for orders placed or performed during the hospital encounter of 10/27/20   CBC Auto Differential   Result Value Ref Range    WBC 10.4 4.5 - 11.5 E9/L    RBC 5.80 3.80 - 5.80 E12/L    Hemoglobin 16.6 (H) 12.5 - 16.5 g/dL    Hematocrit 50.2 37.0 - 54.0 %    MCV 86.6 80.0 - 99.9 fL    MCH 28.6 26.0 - 35.0 pg    MCHC 33.1 32.0 - 34.5 %    RDW 13.8 11.5 - 15.0 fL    Platelets 262 783 - 270 E9/L    MPV 9.8 7.0 - 12.0 fL    Neutrophils % 64.2 43.0 - 80.0 %    Immature Granulocytes % 1.2 0.0 - 5.0 %    Lymphocytes % 19.4 (L) 20.0 - 42.0 %    Monocytes % 11.9 2.0 - 12.0 %    Eosinophils % 2.8 0.0 - 6.0 %    Basophils % 0.5 0.0 - 2.0 %    Neutrophils Absolute 6.67 1.80 - 7.30 E9/L    Immature Granulocytes # 0.12 E9/L    Lymphocytes Absolute 2.02 1.50 - 4.00 E9/L    Monocytes Absolute 1.24 (H) 0.10 - 0.95 E9/L    Eosinophils Absolute 0.29 0.05 - 0.50 E9/L    Basophils Absolute 0.05 0.00 - 0.20 E9/L   Comprehensive Metabolic Panel w/ Reflex to MG   Result Value Ref Range    Sodium 137 132 - 146 mmol/L    Potassium reflex Magnesium 3.8 3.5 - 5.0 mmol/L    Chloride 99 98 - 107 mmol/L    CO2 25 22 - 29 mmol/L    Anion Gap 13 7 - 16 mmol/L    Glucose 104 (H) 74 - 99 mg/dL    BUN 15 6 - 20 mg/dL    CREATININE 0.9 0.7 - 1.2 mg/dL    GFR Non-African American >60 >=60 mL/min/1.73    GFR African American >60     Calcium 9.9 8.6 - 10.2 mg/dL    Total Protein 6.9 6.4 - 8.3 g/dL    Alb 4.0 3.5 - 5.2 g/dL    Total Bilirubin 0.4 0.0 - 1.2 mg/dL    Alkaline Phosphatase 91 40 - 129 U/L    ALT 15 0 - 40 U/L    AST 14 0 - 39 U/L   Lipase   Result Value Ref Range    Lipase 25 13 - 60 U/L   Troponin   Result Value Ref Range    Troponin <0.01 0.00 - 0.03 ng/mL   Covid-19 Ambulatory   Result Value Ref Range    Source np    EKG 12 Lead   Result Value Ref Range    Ventricular Rate 86 BPM    Atrial Rate 86 BPM    P-R Interval 164 ms    QRS Duration 96 ms    Q-T Interval 362 ms    QTc Calculation (Bazett) 433 ms    P Axis 38 degrees    R Axis 10 degrees    T Axis 39 degrees       Radiology:  XR CHEST PORTABLE   Final Result   Mild prominence of the cardiac silhouette. No additional cardiopulmonary   pathology identified.             ------------------------- NURSING NOTES AND VITALS REVIEWED ---------------------------  Date / Time Roomed:  10/27/2020  4:30 PM  ED Bed Assignment:  04/04    The nursing notes within the ED encounter and vital signs as below have been reviewed.    /85   Pulse 82   Temp 98.3 °F (36.8 °C) (Oral)   Resp 20   Ht 6' 3.5\" (1.918 m)   Wt

## 2020-10-28 LAB
EKG ATRIAL RATE: 86 BPM
EKG P AXIS: 38 DEGREES
EKG P-R INTERVAL: 164 MS
EKG Q-T INTERVAL: 362 MS
EKG QRS DURATION: 96 MS
EKG QTC CALCULATION (BAZETT): 433 MS
EKG R AXIS: 10 DEGREES
EKG T AXIS: 39 DEGREES
EKG VENTRICULAR RATE: 86 BPM

## 2020-10-28 PROCEDURE — 93010 ELECTROCARDIOGRAM REPORT: CPT | Performed by: INTERNAL MEDICINE

## 2020-10-29 LAB
SARS-COV-2: NOT DETECTED
SOURCE: NORMAL

## 2021-08-02 ENCOUNTER — HOSPITAL ENCOUNTER (EMERGENCY)
Age: 61
Discharge: HOME | End: 2021-08-02
Payer: COMMERCIAL

## 2021-08-02 VITALS
WEIGHT: 248 LBS | DIASTOLIC BLOOD PRESSURE: 76 MMHG | SYSTOLIC BLOOD PRESSURE: 122 MMHG | HEIGHT: 75 IN | HEART RATE: 67 BPM | TEMPERATURE: 98 F | OXYGEN SATURATION: 96 % | BODY MASS INDEX: 30.84 KG/M2 | RESPIRATION RATE: 18 BRPM

## 2021-08-02 DIAGNOSIS — J02.9 ACUTE PHARYNGITIS, UNSPECIFIED ETIOLOGY: ICD-10-CM

## 2021-08-02 DIAGNOSIS — J01.90 ACUTE SINUSITIS, RECURRENCE NOT SPECIFIED, UNSPECIFIED LOCATION: Primary | ICD-10-CM

## 2021-08-02 PROCEDURE — 99211 OFF/OP EST MAY X REQ PHY/QHP: CPT

## 2021-08-02 RX ORDER — DOXYCYCLINE HYCLATE 100 MG
100 TABLET ORAL 2 TIMES DAILY
Qty: 20 TABLET | Refills: 0 | Status: SHIPPED | OUTPATIENT
Start: 2021-08-02 | End: 2021-08-12

## 2021-08-02 ASSESSMENT — PAIN DESCRIPTION - PAIN TYPE: TYPE: ACUTE PAIN

## 2021-08-02 ASSESSMENT — PAIN DESCRIPTION - ONSET: ONSET: GRADUAL

## 2021-08-02 ASSESSMENT — PAIN DESCRIPTION - PROGRESSION: CLINICAL_PROGRESSION: GRADUALLY WORSENING

## 2021-08-02 ASSESSMENT — PAIN DESCRIPTION - ORIENTATION: ORIENTATION: RIGHT;LEFT

## 2021-08-02 ASSESSMENT — PAIN DESCRIPTION - DESCRIPTORS: DESCRIPTORS: PRESSURE;SORE

## 2021-08-02 ASSESSMENT — PAIN DESCRIPTION - FREQUENCY: FREQUENCY: CONTINUOUS

## 2021-08-02 ASSESSMENT — PAIN SCALES - GENERAL: PAINLEVEL_OUTOF10: 5

## 2021-08-02 ASSESSMENT — PAIN DESCRIPTION - LOCATION: LOCATION: FACE;EAR;THROAT

## 2021-08-02 NOTE — ED PROVIDER NOTES
Department of Emergency Medicine   [address] [place] [address]  Provider Note  Admit Date/RoomTime: 2021  2:27 PM  Room:   NAME: John Ashby  : 1960  MRN: [id number]     Chief Complaint:  Pharyngitis, Otalgia (Having pressure pain in both ears.), Cough (Having sinus drainage.), and Facial Pain    History of Present Illness       John Ashby is a 62 y.o. old male who presents to the emergency department evaluation he has pressure in both ears he has a slight cough from postnasal drip and sinus drainage and he has pain and pressure over his cheeks. He said he has been sick for a few days now. He said he is been vaccinated for Covid. He denies any chest pain shortness of breath loss of taste or smell or body aches. ROS    Pertinent positives and negatives are stated within HPI, all other systems reviewed and are negative. Past Surgical History:   Procedure Laterality Date    APPENDECTOMY      CHOLECYSTECTOMY      COLONOSCOPY      ENDOSCOPY, COLON, DIAGNOSTIC      HERNIA REPAIR      NERVE BLOCK Right 2018    paramedian epidural steroid injection #1    FL CONCHA NOSE/CLEFT LIP/TIP Right 3/12/2018    C6-7 RIGHT PARAMEDIAN EPIDURAL STEROID INJECTION #1 performed by Shilpa Sifuentes MD at [address] [place] [address] History:  reports that he has never smoked. He has never used smokeless tobacco. He reports that he does not drink alcohol and does not use drugs. Family History: family history includes Cancer in his brother and paternal grandmother; High Blood Pressure in his father. Allergies: Patient has no known allergies. Physical Exam            ED Triage Vitals [21 1429]   BP Temp Temp Source Pulse Resp SpO2 Height Weight   122/76 98 °F (36.7 °C) Infrared 67 18 96 % 6' 3\" (1.905 m) 248 lb (112.5 kg)      Oxygen Saturation Interpretation: Normal.    Constitutional:  Alert, development consistent with age.   Ears:  External Ears: Bilateral normal. TM's & External Canals: normal appearance, normal TMs bilaterally. Nose:   There is no discharge, swelling or lesions noted. Sinuses: mild Bilateral maxillary sinus tenderness. no Bilateral frontal sinus tenderness. Mouth:  normal tongue and buccal mucosa. Throat: Mild  Erythema, no  exudates noted. Teeth and gums normal..  Airway Patent. Neck/Lymphatics:  Neck Supple. Respiratory:   Breath sounds: Bilateral normal.  Lung sounds: normal.   CV:  Regular rate and rhythm, normal heart sounds, without pathological murmurs, ectopy, gallops, or rubs. GI:  Abdomen Soft, nontender, good bowel sounds. No firm or pulsatile mass. Integument:  Normal turgor. Warm, dry, without visible rash. Neurological:  Oriented. Motor functions intact. Lab / Imaging Results   (All laboratory and radiology results have been personally reviewed by myself)  Labs:  No results found for this visit on 08/02/21. Imaging: All Radiology results interpreted by Radiologist unless otherwise noted. No orders to display     ED Course / Medical Decision Making   Medications - No data to display       MDM:    Patient has been vaccinated for Covid, his  temperature is 98 ,he has not had a fever. He does have sinus symptoms. We will treat him for sinusitis. I did put him on doxycycline. he can take Tylenol or ibuprofen as needed for discomfort. If he has any worsening symptoms he needs to get reevaluated. Plan of Care: Normal progression of disease discussed. All questions answered. Explained the rationale for symptomatic treatment  Instruction provided in the use of fluids, vaporizer, acetaminophen, and other OTC medication for symptom control. Extra fluids  Analgesics as needed, dose reviewed. Follow up as needed should symptoms fail to improve. Assessment      1. Acute sinusitis, recurrence not specified, unspecified location    2.  Acute pharyngitis, unspecified etiology      Plan Discharge to home and advised to contact Zena Ford, DO  [address] [facility] [address]  [phone]      As needed   Patient condition is good    New Medications     Discharge Medication List as of 8/2/2021  2:48 PM      START taking these medications    Details   doxycycline hyclate (VIBRA-TABS) 100 MG tablet Take 1 tablet by mouth 2 times daily for 10 days, Disp-20 tablet, R-0Normal           Electronically signed by ANTONIO Zhang CNP   DD: 8/2/21  **This report was transcribed using voice recognition software. Every effort was made to ensure accuracy; however, inadvertent computerized transcription errors may be present.   END OF ED PROVIDER NOTE     ANTONIO Zhang CNP  08/02/21 4703

## 2021-09-29 ENCOUNTER — HOSPITAL ENCOUNTER (EMERGENCY)
Age: 61
Discharge: HOME | End: 2021-09-29
Payer: COMMERCIAL

## 2021-09-29 ENCOUNTER — APPOINTMENT (OUTPATIENT)
Dept: GENERAL RADIOLOGY | Age: 61
End: 2021-09-29
Payer: COMMERCIAL

## 2021-09-29 VITALS
RESPIRATION RATE: 18 BRPM | DIASTOLIC BLOOD PRESSURE: 79 MMHG | BODY MASS INDEX: 31.25 KG/M2 | HEART RATE: 77 BPM | OXYGEN SATURATION: 97 % | SYSTOLIC BLOOD PRESSURE: 139 MMHG | WEIGHT: 250 LBS | TEMPERATURE: 98 F

## 2021-09-29 DIAGNOSIS — R05.9 COUGH: ICD-10-CM

## 2021-09-29 DIAGNOSIS — J06.9 ACUTE UPPER RESPIRATORY INFECTION: Primary | ICD-10-CM

## 2021-09-29 PROCEDURE — 71046 X-RAY EXAM CHEST 2 VIEWS: CPT

## 2021-09-29 PROCEDURE — 99211 OFF/OP EST MAY X REQ PHY/QHP: CPT

## 2021-09-29 RX ORDER — PREDNISONE 20 MG/1
TABLET ORAL
Qty: 8 TABLET | Refills: 0 | Status: SHIPPED | OUTPATIENT
Start: 2021-09-29 | End: 2022-03-16

## 2021-09-29 RX ORDER — IPRATROPIUM BROMIDE 21 UG/1
2 SPRAY, METERED NASAL 2 TIMES DAILY PRN
Qty: 30 ML | Refills: 0 | Status: SHIPPED | OUTPATIENT
Start: 2021-09-29

## 2021-09-29 RX ORDER — BENZONATATE 200 MG/1
200 CAPSULE ORAL 3 TIMES DAILY PRN
Qty: 15 CAPSULE | Refills: 0 | Status: SHIPPED | OUTPATIENT
Start: 2021-09-29 | End: 2022-03-16

## 2021-09-29 NOTE — ED PROVIDER NOTES
[address] [facility]  Department of Emergency Medicine  UC Encounter Note  21   8:04 AM EDT      NAME: Rosette Carson  :  1960  MRN:  [id number]    Chief Complaint: Post-COVID Symptoms (6 weeks ago he was positive, sinus symptoms, drainage never went away and now he feels it in his chest)      This is a 67-year-old male the presents to urgent care complaining of cough wheezing and continued sinus congestion and drainage. He says over a month ago he had Covid and still has some symptoms. He denies any shortness of breath at this time but he does state he has asthma and some wheezing. He does use inhalers. He has been using over-the-counter cough and cold medications to help with these sinus and congestion symptoms. Currently he denies any trouble urinating or diarrhea or constipation. No abdominal pain at this time. On first contact patient he appears to be in no acute distress. Review of Systems  Pertinent positives and negatives are stated within HPI, all other systems reviewed and are negative. Physical Exam  Vitals and nursing note reviewed. Constitutional:       Appearance: He is well-developed. HENT:      Head: Normocephalic and atraumatic. Jaw: There is normal jaw occlusion. No trismus. Right Ear: Hearing, ear canal and external ear normal. Tympanic membrane is bulging. Left Ear: Hearing, ear canal and external ear normal. Tympanic membrane is bulging. Nose: Congestion present. Right Sinus: Frontal sinus tenderness present. No maxillary sinus tenderness. Left Sinus: Frontal sinus tenderness present. No maxillary sinus tenderness. Mouth/Throat:      Mouth: Mucous membranes are moist.      Pharynx: Oropharynx is clear. Uvula midline. No uvula swelling. Eyes:      General: Lids are normal.      Conjunctiva/sclera: Conjunctivae normal.      Pupils: Pupils are equal, round, and reactive to light.    Cardiovascular: Rate and Rhythm: Normal rate and regular rhythm. Heart sounds: Normal heart sounds. No murmur heard. Pulmonary:      Effort: Pulmonary effort is normal.      Breath sounds: Normal breath sounds. Abdominal:      General: Bowel sounds are normal.      Palpations: Abdomen is soft. Abdomen is not rigid. Tenderness: There is no abdominal tenderness. There is no guarding or rebound. Musculoskeletal:      Cervical back: Normal range of motion and neck supple. Skin:     General: Skin is warm and dry. Findings: No abrasion or rash. Neurological:      Mental Status: He is alert and oriented to person, place, and time. GCS: GCS eye subscore is 4. GCS verbal subscore is 5. GCS motor subscore is 6. Cranial Nerves: No cranial nerve deficit. Sensory: No sensory deficit. Coordination: Coordination normal.      Gait: Gait normal.         Procedures    MDM  Number of Diagnoses or Management Options  Acute upper respiratory infection  Cough  Diagnosis management comments: Chest xray reviewed. No acute distress  meds discussed  Instructions given.            --------------------------------------------- PAST HISTORY ---------------------------------------------  Past Medical History:  has a past medical history of Acid reflux, Asthma, Back pain, DDD (degenerative disc disease), cervical, Migraines, and Scoliosis. Past Surgical History:  has a past surgical history that includes Appendectomy; hernia repair; Cholecystectomy; Colonoscopy; Endoscopy, colon, diagnostic; Nerve Block (Right, 03/12/2018); and pr cecil nose/cleft lip/tip (Right, 3/12/2018). Social History:  reports that he has never smoked. He has never used smokeless tobacco. He reports that he does not drink alcohol and does not use drugs. Family History: family history includes Cancer in his brother and paternal grandmother; High Blood Pressure in his father.      The patients home medications have been reviewed. Allergies: Patient has no known allergies. -------------------------------------------------- RESULTS -------------------------------------------------  No results found for this visit on 09/29/21. XR CHEST (2 VW)   Final Result   No acute process. ------------------------- NURSING NOTES AND VITALS REVIEWED ---------------------------   The nursing notes within the ED encounter and vital signs as below have been reviewed. /79   Pulse 77   Temp 98 °F (36.7 °C)   Resp 18   Wt 250 lb (113.4 kg)   SpO2 97%   BMI 31.25 kg/m²   Oxygen Saturation Interpretation: Normal      ------------------------------------------ PROGRESS NOTES ------------------------------------------   I have spoken with the patient and discussed todays results, in addition to providing specific details for the plan of care and counseling regarding the diagnosis and prognosis. Their questions are answered at this time and they are agreeable with the plan.      --------------------------------- ADDITIONAL PROVIDER NOTES ---------------------------------     This patient is stable for discharge. I have shared the specific conditions for return, as well as the importance of follow-up. * NOTE: This report was transcribed using voice recognition software. Every effort was made to ensure accuracy; however, inadvertent computerized transcription errors may be present.    --------------------------------- IMPRESSION AND DISPOSITION ---------------------------------    IMPRESSION  1. Acute upper respiratory infection    2.  Cough        DISPOSITION  Disposition: Discharge to home  Patient condition is good       Colt Otoole PA-C  09/29/21 1246

## 2022-03-16 ENCOUNTER — APPOINTMENT (OUTPATIENT)
Dept: CT IMAGING | Age: 62
End: 2022-03-16
Payer: COMMERCIAL

## 2022-03-16 ENCOUNTER — HOSPITAL ENCOUNTER (EMERGENCY)
Age: 62
Discharge: LEFT BEFORE DISCHARGE | End: 2022-03-16
Payer: COMMERCIAL

## 2022-03-16 VITALS
TEMPERATURE: 97.7 F | WEIGHT: 260 LBS | BODY MASS INDEX: 32.33 KG/M2 | OXYGEN SATURATION: 96 % | DIASTOLIC BLOOD PRESSURE: 112 MMHG | SYSTOLIC BLOOD PRESSURE: 159 MMHG | HEIGHT: 75 IN | RESPIRATION RATE: 16 BRPM | HEART RATE: 80 BPM

## 2022-03-16 LAB
ANION GAP SERPL CALCULATED.3IONS-SCNC: 11 MMOL/L (ref 7–16)
BUN BLDV-MCNC: 12 MG/DL (ref 6–23)
CALCIUM SERPL-MCNC: 9.9 MG/DL (ref 8.6–10.2)
CHLORIDE BLD-SCNC: 102 MMOL/L (ref 98–107)
CO2: 25 MMOL/L (ref 22–29)
CREAT SERPL-MCNC: 1 MG/DL (ref 0.7–1.2)
EKG ATRIAL RATE: 82 BPM
EKG P AXIS: 52 DEGREES
EKG P-R INTERVAL: 166 MS
EKG Q-T INTERVAL: 384 MS
EKG QRS DURATION: 100 MS
EKG QTC CALCULATION (BAZETT): 448 MS
EKG R AXIS: 45 DEGREES
EKG T AXIS: 58 DEGREES
EKG VENTRICULAR RATE: 82 BPM
GFR AFRICAN AMERICAN: >60
GFR NON-AFRICAN AMERICAN: >60 ML/MIN/1.73
GLUCOSE BLD-MCNC: 99 MG/DL (ref 74–99)
HCT VFR BLD CALC: 52.8 % (ref 37–54)
HEMOGLOBIN: 17.4 G/DL (ref 12.5–16.5)
MCH RBC QN AUTO: 29.5 PG (ref 26–35)
MCHC RBC AUTO-ENTMCNC: 33 % (ref 32–34.5)
MCV RBC AUTO: 89.5 FL (ref 80–99.9)
PDW BLD-RTO: 12.9 FL (ref 11.5–15)
PLATELET # BLD: 259 E9/L (ref 130–450)
PMV BLD AUTO: 9.5 FL (ref 7–12)
POTASSIUM SERPL-SCNC: 3.9 MMOL/L (ref 3.5–5)
RBC # BLD: 5.9 E12/L (ref 3.8–5.8)
SODIUM BLD-SCNC: 138 MMOL/L (ref 132–146)
TROPONIN, HIGH SENSITIVITY: <6 NG/L (ref 0–11)
WBC # BLD: 10.7 E9/L (ref 4.5–11.5)

## 2022-03-16 PROCEDURE — 93010 ELECTROCARDIOGRAM REPORT: CPT | Performed by: INTERNAL MEDICINE

## 2022-03-16 PROCEDURE — 84484 ASSAY OF TROPONIN QUANT: CPT

## 2022-03-16 PROCEDURE — 70450 CT HEAD/BRAIN W/O DYE: CPT

## 2022-03-16 PROCEDURE — 4500000002 HC ER NO CHARGE

## 2022-03-16 PROCEDURE — 93005 ELECTROCARDIOGRAM TRACING: CPT | Performed by: PHYSICIAN ASSISTANT

## 2022-03-16 PROCEDURE — 80048 BASIC METABOLIC PNL TOTAL CA: CPT

## 2022-03-16 PROCEDURE — 85027 COMPLETE CBC AUTOMATED: CPT

## 2022-03-16 RX ORDER — SODIUM CHLORIDE 0.9 % (FLUSH) 0.9 %
10 SYRINGE (ML) INJECTION PRN
Status: DISCONTINUED | OUTPATIENT
Start: 2022-03-16 | End: 2022-03-16 | Stop reason: HOSPADM

## 2022-03-16 NOTE — ED NOTES
Department of Emergency Medicine  FIRST PROVIDER TRIAGE NOTE             Independent MLP           3/16/22  11:53 AM EDT    Date of Encounter: 3/16/22   MRN: [id number]      HPI: Lyssa Deng is a 62 y.o. male who presents to the ED for No chief complaint on file. Elevated blood pressure at work with associated headache and bodywide tingling. No prior history of hypertension. ROS: Negative for cp, sob, fever or cough. PE: Gen Appearance/Constitutional: alert  HEENT: NC/NT. PERRLA,  Airway patent. Neck: supple     Initial Plan of Care: All treatment areas with department are currently occupied. Plan to order/Initiate the following while awaiting opening in ED: labs, EKG and imaging studies.   Initiate Treatment-Testing, Proceed toTreatment Area When Bed Available for ED Attending/MLP to Continue Care    Electronically signed by DAVID Rogers   DD: 3/16/22         Darin Mckoy [place]  03/16/22 1153

## 2022-12-08 ENCOUNTER — HOSPITAL ENCOUNTER (EMERGENCY)
Age: 62
Discharge: HOME | End: 2022-12-08
Payer: COMMERCIAL

## 2022-12-08 ENCOUNTER — APPOINTMENT (OUTPATIENT)
Dept: CT IMAGING | Age: 62
End: 2022-12-08
Payer: COMMERCIAL

## 2022-12-08 VITALS
SYSTOLIC BLOOD PRESSURE: 134 MMHG | TEMPERATURE: 98.1 F | BODY MASS INDEX: 30.87 KG/M2 | OXYGEN SATURATION: 98 % | RESPIRATION RATE: 16 BRPM | HEART RATE: 65 BPM | DIASTOLIC BLOOD PRESSURE: 80 MMHG | WEIGHT: 247 LBS
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
## 2022-12-08 DIAGNOSIS — S09.90XA MINOR HEAD INJURY WITHOUT LOSS OF CONSCIOUSNESS, INITIAL ENCOUNTER: Primary | ICD-10-CM

## 2022-12-08 DIAGNOSIS — S16.1XXA ACUTE STRAIN OF NECK MUSCLE, INITIAL ENCOUNTER: ICD-10-CM

## 2022-12-08 PROCEDURE — 72125 CT NECK SPINE W/O DYE: CPT

## 2022-12-08 PROCEDURE — 99284 EMERGENCY DEPT VISIT MOD MDM: CPT

## 2022-12-08 PROCEDURE — 70450 CT HEAD/BRAIN W/O DYE: CPT

## 2022-12-08 RX ORDER — ACETAMINOPHEN 500 MG
500 TABLET ORAL 4 TIMES DAILY PRN
Qty: 20 TABLET | Refills: 0 | Status: SHIPPED | OUTPATIENT
Start: 2022-12-08

## 2022-12-08 ASSESSMENT — PAIN SCALES - GENERAL: PAINLEVEL_OUTOF10: 7

## 2022-12-08 ASSESSMENT — PAIN - FUNCTIONAL ASSESSMENT: PAIN_FUNCTIONAL_ASSESSMENT: 0-10

## 2022-12-08 ASSESSMENT — PAIN DESCRIPTION - DESCRIPTORS: DESCRIPTORS: ACHING

## 2022-12-08 ASSESSMENT — PAIN DESCRIPTION - LOCATION: LOCATION: HEAD
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
## 2023-03-19 ENCOUNTER — HOSPITAL ENCOUNTER (EMERGENCY)
Age: 63
Discharge: HOME | End: 2023-03-19
Payer: COMMERCIAL

## 2023-03-19 VITALS
WEIGHT: 250 LBS | OXYGEN SATURATION: 96 % | SYSTOLIC BLOOD PRESSURE: 147 MMHG | BODY MASS INDEX: 31.25 KG/M2 | DIASTOLIC BLOOD PRESSURE: 77 MMHG | TEMPERATURE: 98 F | HEART RATE: 70 BPM | RESPIRATION RATE: 16 BRPM

## 2023-03-19 DIAGNOSIS — J40 SINOBRONCHITIS: Primary | ICD-10-CM

## 2023-03-19 DIAGNOSIS — J32.9 SINOBRONCHITIS: Primary | ICD-10-CM

## 2023-03-19 PROCEDURE — 99211 OFF/OP EST MAY X REQ PHY/QHP: CPT

## 2023-03-19 RX ORDER — DOXYCYCLINE HYCLATE 100 MG
100 TABLET ORAL 2 TIMES DAILY
Qty: 14 TABLET | Refills: 0 | Status: SHIPPED | OUTPATIENT
Start: 2023-03-19 | End: 2023-03-26

## 2023-03-19 RX ORDER — PREDNISONE 10 MG/1
TABLET ORAL
Qty: 14 TABLET | Refills: 0 | Status: SHIPPED | OUTPATIENT
Start: 2023-03-19

## 2023-03-19 ASSESSMENT — PAIN - FUNCTIONAL ASSESSMENT: PAIN_FUNCTIONAL_ASSESSMENT: NONE - DENIES PAIN

## 2023-03-19 NOTE — ED PROVIDER NOTES
(DELTASONE) 10 MG tablet Take 40mg by mouth daily x 2 days, then 20mg by mouth daily x 2 days then 10mg by mouth daily x 2 days. , Disp-14 tablet, R-0Normal             DISCONTINUED MEDICATIONS:  Discharge Medication List as of 3/19/2023  3:30 PM          PATIENT REFERRED TO:  Elaine Mensah MD  [address] [place] [address]  [phone]    Schedule an appointment as soon as possible for a visit       --------------------------------- ADDITIONAL PROVIDER NOTES ---------------------------------  I have spoken with the patient and discussed todays results, in addition to providing specific details for the plan of care and counseling regarding the diagnosis and prognosis. Their questions are answered at this time and they are agreeable with the plan. This patient is stable for discharge. I have shared the specific conditions for return, as well as the importance of follow-up. * NOTE: (Please note that portions of this note were completed with a voice recognition program.  Efforts were made to edit the dictations but occasionally words are mis-transcribed. )    --------------------------------- IMPRESSION AND DISPOSITION ---------------------------------    IMPRESSION  1. Sinobronchitis        DISPOSITION Decision To Discharge 03/19/2023 03:30:51 PM    Disposition: Discharge to home  Patient condition is good    I am the Primary Clinician of Record.      Wilfred King PA-C (electronically signed) on 3/19/2023 at 10:53 PM        Wilfred King PA-C  03/19/23 7631

## 2023-06-25 ENCOUNTER — HOSPITAL ENCOUNTER (EMERGENCY)
Age: 63
Discharge: HOME | End: 2023-06-25
Payer: COMMERCIAL

## 2023-06-25 VITALS
WEIGHT: 250 LBS | DIASTOLIC BLOOD PRESSURE: 70 MMHG | HEART RATE: 92 BPM | BODY MASS INDEX: 31.25 KG/M2 | OXYGEN SATURATION: 96 % | RESPIRATION RATE: 18 BRPM | TEMPERATURE: 98.7 F | SYSTOLIC BLOOD PRESSURE: 119 MMHG

## 2023-06-25 DIAGNOSIS — J01.00 ACUTE MAXILLARY SINUSITIS, RECURRENCE NOT SPECIFIED: Primary | ICD-10-CM

## 2023-06-25 PROCEDURE — 99211 OFF/OP EST MAY X REQ PHY/QHP: CPT

## 2023-06-25 RX ORDER — AZITHROMYCIN 250 MG/1
TABLET, FILM COATED ORAL
Qty: 1 PACKET | Refills: 0 | Status: SHIPPED | OUTPATIENT
Start: 2023-06-25 | End: 2023-07-05

## 2023-06-25 RX ORDER — BROMPHENIRAMINE MALEATE, PSEUDOEPHEDRINE HYDROCHLORIDE, AND DEXTROMETHORPHAN HYDROBROMIDE 2; 30; 10 MG/5ML; MG/5ML; MG/5ML
5 SYRUP ORAL 4 TIMES DAILY PRN
Qty: 120 ML | Refills: 0 | Status: SHIPPED | OUTPATIENT
Start: 2023-06-25 | End: 2023-06-30

## 2023-06-25 ASSESSMENT — PAIN - FUNCTIONAL ASSESSMENT: PAIN_FUNCTIONAL_ASSESSMENT: NONE - DENIES PAIN

## 2023-06-29 ENCOUNTER — HOSPITAL ENCOUNTER (EMERGENCY)
Age: 63
Discharge: HOME | End: 2023-06-29
Payer: COMMERCIAL

## 2023-06-29 ENCOUNTER — APPOINTMENT (OUTPATIENT)
Dept: GENERAL RADIOLOGY | Age: 63
End: 2023-06-29
Payer: COMMERCIAL

## 2023-06-29 VITALS
HEIGHT: 75 IN | DIASTOLIC BLOOD PRESSURE: 82 MMHG | BODY MASS INDEX: 30.84 KG/M2 | HEART RATE: 85 BPM | WEIGHT: 248 LBS | RESPIRATION RATE: 20 BRPM | OXYGEN SATURATION: 98 % | TEMPERATURE: 97.7 F | SYSTOLIC BLOOD PRESSURE: 142 MMHG

## 2023-06-29 DIAGNOSIS — J40 BRONCHITIS: Primary | ICD-10-CM

## 2023-06-29 PROCEDURE — 99211 OFF/OP EST MAY X REQ PHY/QHP: CPT

## 2023-06-29 PROCEDURE — 6360000002 HC RX W HCPCS: Performed by: NURSE PRACTITIONER

## 2023-06-29 PROCEDURE — 71046 X-RAY EXAM CHEST 2 VIEWS: CPT

## 2023-06-29 RX ORDER — DEXAMETHASONE SODIUM PHOSPHATE 10 MG/ML
10 INJECTION, SOLUTION INTRAMUSCULAR; INTRAVENOUS ONCE
Status: COMPLETED | OUTPATIENT
Start: 2023-06-29 | End: 2023-06-29

## 2023-06-29 RX ORDER — DOXYCYCLINE HYCLATE 100 MG
100 TABLET ORAL 2 TIMES DAILY
Qty: 20 TABLET | Refills: 0 | Status: SHIPPED | OUTPATIENT
Start: 2023-06-29 | End: 2023-07-09

## 2023-06-29 RX ORDER — PREDNISONE 10 MG/1
TABLET ORAL
Qty: 20 TABLET | Refills: 0 | Status: SHIPPED | OUTPATIENT
Start: 2023-06-29 | End: 2023-07-09

## 2023-06-29 RX ADMIN — DEXAMETHASONE SODIUM PHOSPHATE 10 MG: 10 INJECTION, SOLUTION INTRAMUSCULAR; INTRAVENOUS at 12:34

## 2023-06-29 ASSESSMENT — PAIN - FUNCTIONAL ASSESSMENT: PAIN_FUNCTIONAL_ASSESSMENT: NONE - DENIES PAIN

## 2023-08-22 LAB
ALBUMIN SERPL-MCNC: 4.4 G/DL (ref 3.5–5.2)
ALP SERPL-CCNC: 77 U/L (ref 40–129)
ALT SERPL-CCNC: 23 U/L (ref 0–40)
AST SERPL-CCNC: 21 U/L (ref 0–39)
BILIRUB DIRECT SERPL-MCNC: <0.2 MG/DL (ref 0–0.3)
BILIRUB INDIRECT SERPL-MCNC: NORMAL MG/DL (ref 0–1)
BILIRUB SERPL-MCNC: 0.4 MG/DL (ref 0–1.2)
PROT SERPL-MCNC: 6.8 G/DL (ref 6.4–8.3)

## 2023-11-08 ENCOUNTER — HOSPITAL ENCOUNTER (EMERGENCY)
Age: 63
Discharge: HOME | End: 2023-11-08
Payer: COMMERCIAL

## 2023-11-08 VITALS
TEMPERATURE: 98.6 F | RESPIRATION RATE: 20 BRPM | SYSTOLIC BLOOD PRESSURE: 113 MMHG | HEART RATE: 75 BPM | DIASTOLIC BLOOD PRESSURE: 78 MMHG | BODY MASS INDEX: 31.5 KG/M2 | OXYGEN SATURATION: 100 % | WEIGHT: 252 LBS

## 2023-11-08 DIAGNOSIS — H69.93 DYSFUNCTION OF BOTH EUSTACHIAN TUBES: ICD-10-CM

## 2023-11-08 DIAGNOSIS — J01.90 ACUTE SINUSITIS, RECURRENCE NOT SPECIFIED, UNSPECIFIED LOCATION: Primary | ICD-10-CM

## 2023-11-08 PROCEDURE — 99211 OFF/OP EST MAY X REQ PHY/QHP: CPT

## 2023-11-08 RX ORDER — DOXYCYCLINE HYCLATE 100 MG
100 TABLET ORAL 2 TIMES DAILY
Qty: 20 TABLET | Refills: 0 | Status: SHIPPED | OUTPATIENT
Start: 2023-11-08 | End: 2023-11-18

## 2023-11-08 ASSESSMENT — PAIN - FUNCTIONAL ASSESSMENT: PAIN_FUNCTIONAL_ASSESSMENT: 0-10

## 2023-11-08 ASSESSMENT — PAIN SCALES - GENERAL: PAINLEVEL_OUTOF10: 0

## 2023-11-08 NOTE — ED PROVIDER NOTES
Department of Emergency  [facility] [address]   Provider Note  Admit Date/Time: 2023  4:58 PM  Room:   NAME: Fredricka Severance  : 1960  MRN: [id number]     Chief Complaint:  Sinusitis (Pressure/drainage) and Cough    History of Present Illness        Fredricka Severance is a 59 y.o. male who has a past medical history of:   Past Medical History:   Diagnosis Date    Acid reflux     Asthma     Back pain     DDD (degenerative disc disease), cervical 3/9/2018    Migraines     Scoliosis     presents to the urgent care center by private car for evaluation. He is here with sinus congestion pressure and pressure in his ears for over a week he has drainage and congestion. He has a history of asthma he has been using his inhaler. He said he does not have chest congestion chest pain or shortness of breath. He does not feel dizzy. The sinus symptoms have been going on for over a week now he has pressure over his cheeks and forehead  ROS    Pertinent positives and negatives are stated within HPI, all other systems reviewed and are negative. Past Surgical History:   Procedure Laterality Date    APPENDECTOMY      CHOLECYSTECTOMY      COLONOSCOPY      ENDOSCOPY, COLON, DIAGNOSTIC      HERNIA REPAIR      NERVE BLOCK Right 2018    paramedian epidural steroid injection #1    DE RHINP DFRM W/COLUM LNGTH TIP ONLY Right 3/12/2018    C6-7 RIGHT PARAMEDIAN EPIDURAL STEROID INJECTION #1 performed by Vitaliy Orlando MD at [address] History:  reports that he has never smoked. He has never used smokeless tobacco. He reports that he does not drink alcohol and does not use drugs. Family History: family history includes Cancer in his brother and paternal grandmother; High Blood Pressure in his father.   Allergies: Amoxil [amoxicillin] and Augmentin [amoxicillin-pot clavulanate]    Physical Exam   Oxygen Saturation Interpretation: Normal.   ED Triage Vitals [23 1702]

## 2023-11-08 NOTE — DISCHARGE INSTRUCTIONS
Your heart is irregular when I listened to it.  I would advise y ou to go to the Emergency Department for evaluation of see your Dr as soon as possible

## 2024-04-12 ENCOUNTER — TRANSCRIBE ORDERS (OUTPATIENT)
Age: 64
End: 2024-04-12

## 2024-04-12 ENCOUNTER — HOSPITAL ENCOUNTER (OUTPATIENT)
Age: 64
Discharge: HOME | End: 2024-04-12
Payer: COMMERCIAL

## 2024-04-12 VITALS — BODY MASS INDEX: 30.2 KG/M2 | HEIGHT: 76 IN | WEIGHT: 248 LBS

## 2024-04-12 DIAGNOSIS — R07.9 CHEST PAIN, UNSPECIFIED TYPE: ICD-10-CM

## 2024-04-12 DIAGNOSIS — R07.9 CHEST PAIN, UNSPECIFIED TYPE: Primary | ICD-10-CM

## 2024-04-12 LAB
ECHO BSA: 2.46 M2
STRESS BASELINE DIAS BP: 86 MMHG
STRESS BASELINE HR: 74 BPM
STRESS BASELINE ST DEPRESSION: 0 MM
STRESS BASELINE SYS BP: 130 MMHG
STRESS ESTIMATED WORKLOAD: 10.1 METS
STRESS EXERCISE DUR MIN: 6 MIN
STRESS EXERCISE DUR SEC: 57 SEC
STRESS O2 SAT REST: 97 %
STRESS ST DEPRESSION: 0 MM
STRESS STAGE 1 BP: NORMAL MMHG
STRESS STAGE 1 DURATION: 3 MIN:SEC
STRESS STAGE 1 HR: 116 BPM
STRESS STAGE 2 BP: NORMAL MMHG
STRESS STAGE 2 DURATION: 6 MIN:SEC
STRESS STAGE 2 HR: 136 BPM
STRESS STAGE 3 BP: NORMAL MMHG
STRESS STAGE 3 COMMENTS: NORMAL
STRESS STAGE 3 DURATION: 7 MIN:SEC
STRESS STAGE 3 HR: 151 BPM
STRESS STAGE RECOVERY 1 BP: NORMAL MMHG
STRESS STAGE RECOVERY 1 DURATION: 1 MIN:SEC
STRESS STAGE RECOVERY 1 HR: 130 BPM
STRESS STAGE RECOVERY 2 BP: NORMAL MMHG
STRESS STAGE RECOVERY 2 DURATION: 3 MIN:SEC
STRESS STAGE RECOVERY 2 HR: 117 BPM
STRESS STAGE RECOVERY 3 BP: NORMAL MMHG
STRESS STAGE RECOVERY 3 DURATION: 6 MIN:SEC
STRESS STAGE RECOVERY 3 HR: 102 BPM
STRESS STAGE RECOVERY 4 BP: NORMAL MMHG
STRESS STAGE RECOVERY 4 COMMENTS: NORMAL
STRESS STAGE RECOVERY 4 DURATION: 9 MIN:SEC
STRESS STAGE RECOVERY 4 HR: 96 BPM
STRESS TARGET HR: 158 BPM

## 2024-04-12 PROCEDURE — 93017 CV STRESS TEST TRACING ONLY: CPT

## 2024-09-25 ENCOUNTER — HOSPITAL ENCOUNTER (EMERGENCY)
Age: 64
Discharge: HOME | End: 2024-09-25
Payer: COMMERCIAL

## 2024-09-25 VITALS
TEMPERATURE: 97.2 F | DIASTOLIC BLOOD PRESSURE: 68 MMHG | HEART RATE: 80 BPM | SYSTOLIC BLOOD PRESSURE: 133 MMHG | RESPIRATION RATE: 16 BRPM | OXYGEN SATURATION: 96 % | WEIGHT: 254 LBS | BODY MASS INDEX: 30.92 KG/M2

## 2024-09-25 DIAGNOSIS — R05.1 ACUTE COUGH: ICD-10-CM

## 2024-09-25 DIAGNOSIS — J01.90 ACUTE NON-RECURRENT SINUSITIS, UNSPECIFIED LOCATION: Primary | ICD-10-CM

## 2024-09-25 PROCEDURE — 99211 OFF/OP EST MAY X REQ PHY/QHP: CPT

## 2024-09-25 RX ORDER — FLUTICASONE PROPIONATE 50 MCG
SPRAY, SUSPENSION (ML) NASAL
Qty: 32 G | Refills: 0 | Status: SHIPPED | OUTPATIENT
Start: 2024-09-25

## 2024-09-25 RX ORDER — DOXYCYCLINE HYCLATE 100 MG
100 TABLET ORAL 2 TIMES DAILY
Qty: 14 TABLET | Refills: 0 | Status: SHIPPED | OUTPATIENT
Start: 2024-09-25 | End: 2024-10-02

## 2024-09-25 RX ORDER — PREDNISONE 20 MG/1
20 TABLET ORAL DAILY
Qty: 5 TABLET | Refills: 0 | Status: SHIPPED | OUTPATIENT
Start: 2024-09-25 | End: 2024-09-30

## 2024-09-25 ASSESSMENT — PAIN - FUNCTIONAL ASSESSMENT
PAIN_FUNCTIONAL_ASSESSMENT: NONE - DENIES PAIN
PAIN_FUNCTIONAL_ASSESSMENT: NONE - DENIES PAIN

## 2024-10-12 PROBLEM — J45.909 ASTHMA: Status: ACTIVE | Noted: 2024-10-12

## 2024-10-12 PROBLEM — R91.1 LUNG NODULE: Status: ACTIVE | Noted: 2024-10-12

## 2024-10-12 PROBLEM — R06.02 SOB (SHORTNESS OF BREATH) ON EXERTION: Status: ACTIVE | Noted: 2024-10-12

## 2024-10-12 PROBLEM — I27.20 PULMONARY HYPERTENSION (MULTI): Status: ACTIVE | Noted: 2024-10-12

## 2024-10-12 PROBLEM — R93.1 ABNORMAL ECHOCARDIOGRAM: Status: ACTIVE | Noted: 2024-10-12

## 2024-10-12 PROBLEM — E66.811 OBESITY, CLASS I, BMI 30-34.9: Status: ACTIVE | Noted: 2024-10-12

## 2024-10-12 PROBLEM — G47.33 OBSTRUCTIVE SLEEP APNEA, ADULT: Status: ACTIVE | Noted: 2024-10-12

## 2024-10-12 RX ORDER — FLUTICASONE FUROATE AND VILANTEROL 200; 25 UG/1; UG/1
POWDER RESPIRATORY (INHALATION)
COMMUNITY
Start: 2022-01-28

## 2024-10-12 RX ORDER — IPRATROPIUM BROMIDE 21 UG/1
SPRAY, METERED NASAL
COMMUNITY
Start: 2021-09-29

## 2024-10-12 RX ORDER — ALBUTEROL SULFATE 90 UG/1
INHALANT RESPIRATORY (INHALATION)
COMMUNITY
Start: 2021-11-03

## 2024-10-13 NOTE — PROGRESS NOTES
Counseling:  The patient was counseled regarding diagnostic results, instructions for management, risk factor reductions, prognosis, patient and family education, impressions, risks and benefits of treatment options and importance of compliance with treatment.      Chief Complaint:  The patient presents today for cardiovascular evaluation of atrial fibrillation.     History Of Present Illness:    Camden Lance is a 64 y.o. male patient whose PMH is significant for asthma, pulmonary arterial hypertension, lung nodule and LOI. He presents today to establish cardiovascular care for the evaluation and management of atrial fibrillation. The patient states that he has been evaluated by Dr. Chong as well as Dr. Banks and was told that he had a-fib; however, when he was instructed to present to the ED he was found to be in NSR. Exercise stress test performed in 04/2024 showed SR with PVCs/PACs. He reports occasional palpitations that occur perhaps twice per week, noting that he has a very stressful job. Per the patient, he has a diagnosis of LOI, but is intolerant of CPAP therapy. The patient's family history is negative for a-fib. EKG today shows that the patient is in sinus rhythm.     Past Surgical History:  He has a past surgical history that includes Other surgical history (06/20/2022); Other surgical history (06/20/2022); and Other surgical history (06/20/2022).      Social History:  He reports that he has never smoked. He has never used smokeless tobacco. He reports that he does not currently use alcohol. He reports that he does not currently use drugs.    Family History:  No family history on file.     Allergies:  Amoxicillin and Penicillins    Outpatient Medications:  Current Outpatient Medications   Medication Instructions    albuterol 90 mcg/actuation inhaler inhalation    albuterol 2.5 mg, nebulization    ALPRAZolam (XANAX) 0.5 mg, oral, As needed    fluticasone furoate-vilanteroL (Breo Ellipta) 200-25  "mcg/dose inhaler inhalation    ipratropium (Atrovent) 21 mcg (0.03 %) nasal spray nasal    predniSONE (DELTASONE) 5 mg, oral        Last Recorded Vitals:  Vitals:    10/14/24 1118   BP: 120/90   BP Location: Left arm   Pulse: 71   Weight: 117 kg (257 lb)   Height: 1.905 m (6' 3\")       Review of Systems   Cardiovascular:  Positive for palpitations.   All other systems reviewed and are negative.     Physical Exam:  Constitutional:       Appearance: Healthy appearance. Not in distress.   Neck:      Vascular: No JVR. JVD normal.   Pulmonary:      Effort: Pulmonary effort is normal.      Breath sounds: Normal breath sounds. No wheezing. No rhonchi. No rales.   Chest:      Chest wall: Not tender to palpatation.   Cardiovascular:      PMI at left midclavicular line. Normal rate. Regular rhythm. Normal S1. Normal S2.       Murmurs: There is no murmur.      No gallop.  No click. No rub.   Pulses:     Intact distal pulses.   Edema:     Peripheral edema absent.   Abdominal:      General: Bowel sounds are normal.      Palpations: Abdomen is soft.      Tenderness: There is no abdominal tenderness.   Musculoskeletal: Normal range of motion.         General: No tenderness. Skin:     General: Skin is warm and dry.   Neurological:      General: No focal deficit present.      Mental Status: Alert and oriented to person, place and time.       Last Cardiology Tests:  04/12/2024 - Exercise Stress Test  1. ECG: Resting ECG demonstrates normal sinus rhythm with premature atrial complexes.   2. ECG: The stress ECG was negative for ischemia. There were frequent PACs and occasional PVCs.   3. NORMAL TREADMILL STRESS TEST BASED ON ST-SEGMENT     08/30/2022 - Cardiac MRI  1. Normal LV size (EDVi  93ml/m2) with low-normal systolic function (LVEF 53%).  Focal hypokinesis in the basal inferolateral segment.   2. Quantitatively normal RV size (EDVi 91ml/m2) with normal systolic function (RVEF 54%).   3. Multi-focal mid-myocardial late gadolinium " "enhancement involving the basal lateral segment which likely represent prior myocarditis; however, sarcoidosis remains in the differential diagnosis.    05/17/2022 - TTE  1. Normal LVEF of 56%.  2. Borderline left ventricular hypertrophy.   3. The left ventricle is borderline dilated.  4. Right ventricular cavity is borderline dilated.   5. Trivial tricuspid regurgitation.  6. Moderate pulmonary hypertension.  7. The atrial septum is possibly intact but not seen well enough to rule out an atrial septal defect or PFO.  8. There is trace pericardial effusion but no tamponade.   9. The aortic root is borderline dilated.      Lab review: I have personally reviewed the laboratory result(s).    Assessment/Plan   1) Atrial Fibrillation  Evaluated by Dr. Chong as well as Dr. Banks and was told that he had a-fib   Indicates that Dr. Chong noted an \"erratic\" rhythm, although difficult to interpret - ? Baseline artifact   When evaluated in [facility] ED, he was found to be in NSR  Reports occasional palpitations  Exercise stress test 04/12/2024 showed SR with PVCs/PACs  FH negative for a-fib  Personal h/o LOI - intolerant of CPAP  I wonder if PACs are being misinterpreted for a-fib   In NSR today by EKG  Will request records from Dr. Chong's office  Check 14-day Holter  F/U after testing         Scribe Attestation  By signing my name below, I, Tee Rothman   attest that this documentation has been prepared under the direction and in the presence of Ramiro Neal MD.   "

## 2024-10-14 ENCOUNTER — OFFICE VISIT (OUTPATIENT)
Dept: CARDIOLOGY | Facility: HOSPITAL | Age: 64
End: 2024-10-14
Payer: COMMERCIAL

## 2024-10-14 VITALS
BODY MASS INDEX: 31.95 KG/M2 | HEIGHT: 75 IN | HEART RATE: 71 BPM | SYSTOLIC BLOOD PRESSURE: 120 MMHG | WEIGHT: 257 LBS | DIASTOLIC BLOOD PRESSURE: 90 MMHG

## 2024-10-14 DIAGNOSIS — R00.2 PALPITATIONS: Primary | ICD-10-CM

## 2024-10-14 PROBLEM — M48.02 CERVICAL STENOSIS OF SPINAL CANAL: Status: ACTIVE | Noted: 2018-02-12

## 2024-10-14 PROBLEM — M54.12 CERVICAL RADICULOPATHY: Status: ACTIVE | Noted: 2018-03-09

## 2024-10-14 PROBLEM — M47.812 CERVICAL FACET JOINT SYNDROME: Status: ACTIVE | Noted: 2018-03-09

## 2024-10-14 PROBLEM — M50.30 DDD (DEGENERATIVE DISC DISEASE), CERVICAL: Status: ACTIVE | Noted: 2018-03-09

## 2024-10-14 PROBLEM — S86.011D ACHILLES TENDON TEAR, RIGHT, SUBSEQUENT ENCOUNTER: Status: ACTIVE | Noted: 2023-09-22

## 2024-10-14 PROBLEM — M54.2 NECK PAIN: Status: ACTIVE | Noted: 2018-03-09

## 2024-10-14 PROCEDURE — 99213 OFFICE O/P EST LOW 20 MIN: CPT | Performed by: INTERNAL MEDICINE

## 2024-10-14 PROCEDURE — 93005 ELECTROCARDIOGRAM TRACING: CPT | Performed by: INTERNAL MEDICINE

## 2024-10-14 RX ORDER — PREDNISONE 5 MG/1
5 TABLET ORAL
COMMUNITY
Start: 2024-10-09

## 2024-10-14 RX ORDER — ALPRAZOLAM 0.5 MG/1
0.5 TABLET ORAL AS NEEDED
COMMUNITY
Start: 2023-11-08

## 2024-10-14 RX ORDER — ALBUTEROL SULFATE 0.83 MG/ML
2.5 SOLUTION RESPIRATORY (INHALATION)
COMMUNITY
Start: 2024-04-24

## 2024-10-14 ASSESSMENT — ENCOUNTER SYMPTOMS
PALPITATIONS: 1
OCCASIONAL FEELINGS OF UNSTEADINESS: 0
LOSS OF SENSATION IN FEET: 0
DEPRESSION: 0

## 2024-10-14 NOTE — PATIENT INSTRUCTIONS
Dr. Neal has ordered a heart monitor to assess your heart rhythm.  Followup with Dr. Neal after the above test.    If you have any questions or cardiac concerns, please call our office at [phone].

## 2024-10-14 NOTE — Clinical Note
October 14, 2024       No Recipients    Patient: Camden Lance   YOB: 1960   Date of Visit: 10/14/2024       Dear Dr. Gonzalez Recipients:    Thank you for referring Camden Lance to me for evaluation. Below are my notes for this consultation.  If you have questions, please do not hesitate to call me. I look forward to following your patient along with you.       Sincerely,     Ramiro Neal MD      CC:   No Recipients  ______________________________________________________________________________________    Counseling:  The patient was counseled regarding diagnostic results, instructions for management, risk factor reductions, prognosis, patient and family education, impressions, risks and benefits of treatment options and importance of compliance with treatment.      Chief Complaint:  The patient presents today for cardiovascular evaluation of atrial fibrillation.     History Of Present Illness:    Camden Lance is a 64 y.o. male patient whose PMH is significant for asthma, pulmonary arterial hypertension, lung nodule and LOI. He presents today to establish cardiovascular care for the evaluation and management of atrial fibrillation.     Past Surgical History:  He has a past surgical history that includes Other surgical history (06/20/2022); Other surgical history (06/20/2022); and Other surgical history (06/20/2022).      Social History:  He has no history on file for tobacco use, alcohol use, and drug use.    Family History:  No family history on file.     Allergies:  Patient has no allergy information on record.    Outpatient Medications:  Current Outpatient Medications   Medication Instructions    albuterol 90 mcg/actuation inhaler inhalation    fluticasone furoate-vilanteroL (Breo Ellipta) 200-25 mcg/dose inhaler inhalation    ipratropium (Atrovent) 21 mcg (0.03 %) nasal spray nasal        Last Recorded Vitals:  There were no vitals filed for this visit.    Review of Systems   All other  systems reviewed and are negative.     Physical Exam:  Constitutional:       Appearance: Healthy appearance. Not in distress.   Neck:      Vascular: No JVR. JVD normal.   Pulmonary:      Effort: Pulmonary effort is normal.      Breath sounds: Normal breath sounds. No wheezing. No rhonchi. No rales.   Chest:      Chest wall: Not tender to palpatation.   Cardiovascular:      PMI at left midclavicular line. Normal rate. Regular rhythm. Normal S1. Normal S2.       Murmurs: There is no murmur.      No gallop.  No click. No rub.   Pulses:     Intact distal pulses.   Edema:     Peripheral edema absent.   Abdominal:      General: Bowel sounds are normal.      Palpations: Abdomen is soft.      Tenderness: There is no abdominal tenderness.   Musculoskeletal: Normal range of motion.         General: No tenderness. Skin:     General: Skin is warm and dry.   Neurological:      General: No focal deficit present.      Mental Status: Alert and oriented to person, place and time.       Last Cardiology Tests:  08/30/2022 - Cardiac MRI  1. Normal LV size (EDVi  93ml/m2) with low-normal systolic function (LVEF 53%).  Focal hypokinesis in the basal inferolateral segment.   2. Quantitatively normal RV size (EDVi 91ml/m2) with normal systolic function (RVEF 54%).   3. Multi-focal mid-myocardial late gadolinium enhancement involving the basal lateral segment which likely represent prior myocarditis; however, sarcoidosis remains in the differential diagnosis.    05/17/2022 - TTE  1. Normal LVEF of 56%.  2. Borderline left ventricular hypertrophy.   3. The left ventricle is borderline dilated.  4. Right ventricular cavity is borderline dilated.   5. Trivial tricuspid regurgitation.  6. Moderate pulmonary hypertension.  7. The atrial septum is possibly intact but not seen well enough to rule out an atrial septal defect or PFO.  8. There is trace pericardial effusion but no tamponade.   9. The aortic root is borderline dilated.      Lab  review: I have personally reviewed the laboratory result(s).    Assessment/Plan  1) Atrial Fibrillation        Scribe Attestation  By signing my name below, I, Tee Rothman   attest that this documentation has been prepared under the direction and in the presence of Ramiro Neal MD.

## 2024-10-17 DIAGNOSIS — J01.90 ACUTE NON-RECURRENT SINUSITIS, UNSPECIFIED LOCATION: ICD-10-CM

## 2024-10-17 RX ORDER — FLUTICASONE PROPIONATE 50 MCG
SPRAY, SUSPENSION (ML) NASAL
Refills: 1 | OUTPATIENT
Start: 2024-10-17

## 2024-10-23 ENCOUNTER — ANCILLARY PROCEDURE (OUTPATIENT)
Dept: CARDIOLOGY | Facility: HOSPITAL | Age: 64
End: 2024-10-23
Payer: COMMERCIAL

## 2024-10-23 DIAGNOSIS — R00.2 PALPITATIONS: ICD-10-CM

## 2024-10-23 PROBLEM — R07.9 CHEST PAIN: Status: ACTIVE | Noted: 2024-03-20

## 2024-10-23 PROBLEM — I49.9 IRREGULAR HEART BEAT: Status: ACTIVE | Noted: 2024-03-20

## 2024-10-23 PROBLEM — J45.909 ASTHMA WITHOUT STATUS ASTHMATICUS (HHS-HCC): Status: ACTIVE | Noted: 2022-06-09

## 2024-10-23 PROBLEM — J45.30 MILD PERSISTENT ASTHMA: Status: ACTIVE | Noted: 2023-05-03

## 2024-10-23 PROBLEM — E66.9 OBESITY WITH BODY MASS INDEX 30 OR GREATER: Status: ACTIVE | Noted: 2024-03-20

## 2024-10-23 PROBLEM — I27.21 SECONDARY PULMONARY ARTERIAL HYPERTENSION (MULTI): Status: ACTIVE | Noted: 2022-06-09

## 2024-10-23 PROBLEM — G47.33 OBSTRUCTIVE SLEEP APNEA SYNDROME: Status: ACTIVE | Noted: 2022-06-09

## 2024-10-23 PROBLEM — G47.36 HYPOXEMIA ASSOCIATED WITH SLEEP: Status: ACTIVE | Noted: 2024-03-20

## 2024-10-23 PROCEDURE — 93246 EXT ECG>7D<15D RECORDING: CPT

## 2024-10-23 PROCEDURE — 93248 EXT ECG>7D<15D REV&INTERPJ: CPT | Performed by: STUDENT IN AN ORGANIZED HEALTH CARE EDUCATION/TRAINING PROGRAM

## 2024-10-23 NOTE — PROGRESS NOTES
Aixa placed a Holter on the patient on 10/14/24 for 14 days    Patient was told what can and can not be done while wearing it     Patient showed understanding

## 2024-11-17 NOTE — PROGRESS NOTES
"Counseling:  The patient was counseled regarding diagnostic results, instructions for management, risk factor reductions, prognosis, patient and family education, impressions, risks and benefits of treatment options and importance of compliance with treatment.      Chief Complaint:  The patient presents today for 1-month followup of palpitations (?a-fib) s/p Holter Monitor.      History Of Present Illness:    Camden Lance is a 64 y.o. male patient who presents today for 1-month followup of palpitations (?a-fib) s/p Holter Monitor. His PMH is significant for asthma, pulmonary arterial hypertension, lung nodule and LOI. Holter monitoring performed from 10/14/2024 to 10/27/2024 revealed 3 runs of v-tach, 189 runs of SVT, frequent isolated SVEs and SVE couplets, occasional SVE triplets, and rare isolated VEs and VE couplets. Upon personal review of the Holter monitor, the episodes of v-tach appear to be atrial tachycardia, and episodes of SVT appear to be atrial fibrillation. The patient reports having a few episodes of palpitations while wearing the heart monitor. He also reports a 3-day history of intermittent chest pain rated as \"medium\" in severity.     Past Surgical History:  He has a past surgical history that includes Other surgical history (06/20/2022); Other surgical history (06/20/2022); and Other surgical history (06/20/2022).      Social History:  He reports that he has never smoked. He has never used smokeless tobacco. He reports that he does not currently use alcohol. He reports that he does not currently use drugs.    Family History:  No family history on file.     Allergies:  Amoxicillin and Penicillins    Outpatient Medications:  Current Outpatient Medications   Medication Instructions    albuterol 90 mcg/actuation inhaler Inhale.    albuterol 2.5 mg    ALPRAZolam (XANAX) 0.5 mg, As needed    fluticasone furoate-vilanteroL (Breo Ellipta) 200-25 mcg/dose inhaler Inhale.    ipratropium (Atrovent) 21 " "mcg (0.03 %) nasal spray Administer into affected nostril(s).    predniSONE (DELTASONE) 5 mg        Last Recorded Vitals:  Vitals:    11/18/24 1319   BP: 138/80   BP Location: Left arm   Pulse: 63   SpO2: 96%   Weight: 116 kg (256 lb)   Height: 1.905 m (6' 3\")         Review of Systems   Cardiovascular:  Positive for chest pain and palpitations.   All other systems reviewed and are negative.     Physical Exam:  Constitutional:       Appearance: Healthy appearance. Not in distress.   Neck:      Vascular: No JVR. JVD normal.   Pulmonary:      Effort: Pulmonary effort is normal.      Breath sounds: Normal breath sounds. No wheezing. No rhonchi. No rales.   Chest:      Chest wall: Not tender to palpatation.   Cardiovascular:      PMI at left midclavicular line. Normal rate. Regular rhythm. Normal S1. Normal S2.       Murmurs: There is no murmur.      No gallop.  No click. No rub.   Pulses:     Intact distal pulses.   Edema:     Peripheral edema absent.   Abdominal:      General: Bowel sounds are normal.      Palpations: Abdomen is soft.      Tenderness: There is no abdominal tenderness.   Musculoskeletal: Normal range of motion.         General: No tenderness. Skin:     General: Skin is warm and dry.   Neurological:      General: No focal deficit present.      Mental Status: Alert and oriented to person, place and time.       Last Cardiology Tests:  10/14/2024 to 10/27/2024 - Holter Monitor  1. Predominant underlying rhythm was sinus rhythm; min HR 44 bpm, max  bpm, avg HR 74 bpm.  2. Slight P wave morphology changes were noted  3. 3 ventricular tachycardia runs occurred; fastest interval 4 beats with max rate 160 bpm, longest lasting 14 beats with avg rate 110 bpm.  4. 189 supraventricular tachycardia runs occurred; fastest interval lasting 10 beats with max rate 171 bpm, longest lasting 14.9 secs with avg rate 119 bpm.  4. Isolated SVEs were frequent, SVE couplets were frequent, and SVE triplets were " "occasional.  5. Isolated VEs were rare, VE couplets were rare, and no VE triplets were present.  6. Ventricular trigeminy was present.    04/12/2024 - Exercise Stress Test  1. ECG: Resting ECG demonstrates normal sinus rhythm with premature atrial complexes.   2. ECG: The stress ECG was negative for ischemia. There were frequent PACs and occasional PVCs.   3. NORMAL TREADMILL STRESS TEST BASED ON ST-SEGMENT     08/30/2022 - Cardiac MRI  1. Normal LV size (EDVi  93ml/m2) with low-normal systolic function (LVEF 53%).  Focal hypokinesis in the basal inferolateral segment.   2. Quantitatively normal RV size (EDVi 91ml/m2) with normal systolic function (RVEF 54%).   3. Multi-focal mid-myocardial late gadolinium enhancement involving the basal lateral segment which likely represent prior myocarditis; however, sarcoidosis remains in the differential diagnosis.    05/17/2022 - TTE  1. Normal LVEF of 56%.  2. Borderline left ventricular hypertrophy.   3. The left ventricle is borderline dilated.  4. Right ventricular cavity is borderline dilated.   5. Trivial tricuspid regurgitation.  6. Moderate pulmonary hypertension.  7. The atrial septum is possibly intact but not seen well enough to rule out an atrial septal defect or PFO.  8. There is trace pericardial effusion but no tamponade.   9. The aortic root is borderline dilated.      Diagnostic review: I have personally reviewed the result(s) of the Holter Monitor.     Assessment/Plan   1) Atrial Fibrillation   Evaluated by Dr. Chong as well as Dr. Banks and was told that he had a-fib   Indicates that Dr. Chong noted an \"erratic\" rhythm, although difficult to interpret - ? Baseline artifact   When evaluated in [place] ED, he was found to be in NSR  Reports occasional palpitations  Exercise stress test 04/12/2024 showed SR with PVCs/PACs  FH negative for a-fib  Personal h/o LOI - intolerant of CPAP  I wonder if PACs are being misinterpreted for a-fib   In NSR today by " "EKG  Will request records from Dr. Chong's office  Holter with 3 runs of v-tach, 189 runs of SVT, frequent isolated SVEs and SVE couplets, occasional SVE triplets, and rare isolated VEs and VE couplets.   Upon personal review of monitor results, episodes of v-tach appear to be atrial tachycardia, and episodes of SVT appear to be intermittent atrial fibrillation.  Patient reports a few episodes of palpitations while wearing the monitor  Start Eliquis 5 mg BID    2) Chest Pain  Reports a 3-day history of intermittent chest pain, \"medium\" in severity  Check CTA coronary arteries - advised on pre-procedure metoprolol dosing   F/U after testing       Scribe Attestation  By signing my name below, I, Tee Rothman   attest that this documentation has been prepared under the direction and in the presence of Ramiro Neal MD.   "

## 2024-11-18 ENCOUNTER — OFFICE VISIT (OUTPATIENT)
Dept: CARDIOLOGY | Facility: HOSPITAL | Age: 64
End: 2024-11-18
Payer: COMMERCIAL

## 2024-11-18 VITALS
OXYGEN SATURATION: 96 % | HEIGHT: 75 IN | WEIGHT: 256 LBS | DIASTOLIC BLOOD PRESSURE: 80 MMHG | BODY MASS INDEX: 31.83 KG/M2 | SYSTOLIC BLOOD PRESSURE: 138 MMHG | HEART RATE: 63 BPM

## 2024-11-18 DIAGNOSIS — R00.2 PALPITATIONS: ICD-10-CM

## 2024-11-18 DIAGNOSIS — I25.9 CHEST PAIN DUE TO MYOCARDIAL ISCHEMIA, UNSPECIFIED ISCHEMIC CHEST PAIN TYPE: Primary | ICD-10-CM

## 2024-11-18 PROCEDURE — 93005 ELECTROCARDIOGRAM TRACING: CPT | Performed by: INTERNAL MEDICINE

## 2024-11-18 PROCEDURE — 99213 OFFICE O/P EST LOW 20 MIN: CPT | Performed by: INTERNAL MEDICINE

## 2024-11-18 PROCEDURE — 3008F BODY MASS INDEX DOCD: CPT | Performed by: INTERNAL MEDICINE

## 2024-11-18 PROCEDURE — 93010 ELECTROCARDIOGRAM REPORT: CPT | Performed by: INTERNAL MEDICINE

## 2024-11-18 RX ORDER — METOPROLOL TARTRATE 25 MG/1
TABLET, FILM COATED ORAL
Qty: 2 TABLET | Refills: 11 | Status: SHIPPED | OUTPATIENT
Start: 2024-11-18

## 2024-11-18 ASSESSMENT — ENCOUNTER SYMPTOMS: PALPITATIONS: 1

## 2024-11-18 NOTE — PATIENT INSTRUCTIONS
Start Eliquis 5 mg twice daily. A prescription has been sent to your pharmacy.    Dr. Neal has ordered a CT angiogram of your heart arteries. In order to get adequate images, we need your heart rate to be on the slower side. To achieve this, you will take a medication called metoprolol the night before and the morning of the test. A prescription for this medication has been sent to your pharmacy.    Followup with Dr. Neal after the above test.    If you have any questions or cardiac concerns, please call our office at [phone].

## 2024-11-18 NOTE — Clinical Note
November 18, 2024     No Recipients    Patient: Camden Lance   YOB: 1960   Date of Visit: 11/18/2024       Dear Dr. Gonzalez Recipients:    Thank you for referring Camden Lance to me for evaluation. Below are my notes for this consultation.  If you have questions, please do not hesitate to call me. I look forward to following your patient along with you.       Sincerely,     Ramiro Neal MD      CC: No Recipients  ______________________________________________________________________________________    Counseling:  The patient was counseled regarding diagnostic results, instructions for management, risk factor reductions, prognosis, patient and family education, impressions, risks and benefits of treatment options and importance of compliance with treatment.      Chief Complaint:  The patient presents today for 1-month followup of palpitations (?a-fib) s/p Holter Monitor.      History Of Present Illness:    Camden Lance is a 64 y.o. male patient who presents today for 1-month followup of palpitations (?a-fib) s/p Holter Monitor. His PMH is significant for asthma, pulmonary arterial hypertension, lung nodule and LOI. Holter monitoring performed from 10/14/2024 to 10/27/2024 revealed 3 runs of v-tach, 189 runs of SVT, frequent isolated SVEs and SVE couplets, occasional SVE triplets, and rare isolated VEs and VE couplets.   Past Surgical History:  He has a past surgical history that includes Other surgical history (06/20/2022); Other surgical history (06/20/2022); and Other surgical history (06/20/2022).      Social History:  He reports that he has never smoked. He has never used smokeless tobacco. He reports that he does not currently use alcohol. He reports that he does not currently use drugs.    Family History:  No family history on file.     Allergies:  Amoxicillin and Penicillins    Outpatient Medications:  Current Outpatient Medications   Medication Instructions    albuterol 90 mcg/actuation  inhaler inhalation    albuterol 2.5 mg, nebulization    ALPRAZolam (XANAX) 0.5 mg, oral, As needed    fluticasone furoate-vilanteroL (Breo Ellipta) 200-25 mcg/dose inhaler inhalation    ipratropium (Atrovent) 21 mcg (0.03 %) nasal spray nasal    predniSONE (DELTASONE) 5 mg, oral        Last Recorded Vitals:  There were no vitals filed for this visit.      Review of Systems   All other systems reviewed and are negative.     Physical Exam:  Constitutional:       Appearance: Healthy appearance. Not in distress.   Neck:      Vascular: No JVR. JVD normal.   Pulmonary:      Effort: Pulmonary effort is normal.      Breath sounds: Normal breath sounds. No wheezing. No rhonchi. No rales.   Chest:      Chest wall: Not tender to palpatation.   Cardiovascular:      PMI at left midclavicular line. Normal rate. Regular rhythm. Normal S1. Normal S2.       Murmurs: There is no murmur.      No gallop.  No click. No rub.   Pulses:     Intact distal pulses.   Edema:     Peripheral edema absent.   Abdominal:      General: Bowel sounds are normal.      Palpations: Abdomen is soft.      Tenderness: There is no abdominal tenderness.   Musculoskeletal: Normal range of motion.         General: No tenderness. Skin:     General: Skin is warm and dry.   Neurological:      General: No focal deficit present.      Mental Status: Alert and oriented to person, place and time.       Last Cardiology Tests:  10/14/2024 to 10/27/2024 - Holter Monitor  1. Predominant underlying rhythm was sinus rhythm; min HR 44 bpm, max  bpm, avg HR 74 bpm.  2. Slight P wave morphology changes were noted  3. 3 ventricular tachycardia runs occurred; fastest interval 4 beats with max rate 160 bpm, longest lasting 14 beats with avg rate 110 bpm.  4. 189 supraventricular tachycardia runs occurred; fastest interval lasting 10 beats with max rate 171 bpm, longest lasting 14.9 secs with avg rate 119 bpm.  4. Isolated SVEs were frequent, SVE couplets were frequent, and  "SVE triplets were occasional.  5. Isolated VEs were rare, VE couplets were rare, and no VE triplets were present.  6. Ventricular trigeminy was present.    04/12/2024 - Exercise Stress Test  1. ECG: Resting ECG demonstrates normal sinus rhythm with premature atrial complexes.   2. ECG: The stress ECG was negative for ischemia. There were frequent PACs and occasional PVCs.   3. NORMAL TREADMILL STRESS TEST BASED ON ST-SEGMENT     08/30/2022 - Cardiac MRI  1. Normal LV size (EDVi  93ml/m2) with low-normal systolic function (LVEF 53%).  Focal hypokinesis in the basal inferolateral segment.   2. Quantitatively normal RV size (EDVi 91ml/m2) with normal systolic function (RVEF 54%).   3. Multi-focal mid-myocardial late gadolinium enhancement involving the basal lateral segment which likely represent prior myocarditis; however, sarcoidosis remains in the differential diagnosis.    05/17/2022 - TTE  1. Normal LVEF of 56%.  2. Borderline left ventricular hypertrophy.   3. The left ventricle is borderline dilated.  4. Right ventricular cavity is borderline dilated.   5. Trivial tricuspid regurgitation.  6. Moderate pulmonary hypertension.  7. The atrial septum is possibly intact but not seen well enough to rule out an atrial septal defect or PFO.  8. There is trace pericardial effusion but no tamponade.   9. The aortic root is borderline dilated.      Diagnostic review: I have personally reviewed the result(s) of the Holter Monitor.     Assessment/Plan  1) ?Atrial Fibrillation  Evaluated by Dr. Chong as well as Dr. Banks and was told that he had a-fib   Indicates that Dr. Chong noted an \"erratic\" rhythm, although difficult to interpret - ? Baseline artifact   When evaluated in [place] ED, he was found to be in NSR  Reports occasional palpitations  Exercise stress test 04/12/2024 showed SR with PVCs/PACs  FH negative for a-fib  Personal h/o LOI - intolerant of CPAP  I wonder if PACs are being misinterpreted for a-fib   In " NSR today by EKG  Will request records from Dr. Chong's office  Holter with 3 runs of v-tach, 189 runs of SVT, frequent isolated SVEs and SVE couplets, occasional SVE triplets, and rare isolated VEs and VE couplets.         Scribe Attestation  By signing my name below, I, Tee Rothman   attest that this documentation has been prepared under the direction and in the presence of Ramiro Neal MD.

## 2024-12-18 ENCOUNTER — HOSPITAL ENCOUNTER (EMERGENCY)
Age: 64
Discharge: HOME | End: 2024-12-18
Payer: COMMERCIAL

## 2024-12-18 VITALS
WEIGHT: 252 LBS | HEART RATE: 60 BPM | TEMPERATURE: 98.6 F | OXYGEN SATURATION: 94 % | BODY MASS INDEX: 31.33 KG/M2 | DIASTOLIC BLOOD PRESSURE: 76 MMHG | RESPIRATION RATE: 16 BRPM | SYSTOLIC BLOOD PRESSURE: 136 MMHG | HEIGHT: 75 IN

## 2024-12-18 DIAGNOSIS — R05.9 COUGH, UNSPECIFIED TYPE: ICD-10-CM

## 2024-12-18 DIAGNOSIS — J01.90 ACUTE SINUSITIS, RECURRENCE NOT SPECIFIED, UNSPECIFIED LOCATION: Primary | ICD-10-CM

## 2024-12-18 PROCEDURE — 99211 OFF/OP EST MAY X REQ PHY/QHP: CPT

## 2024-12-18 RX ORDER — DOXYCYCLINE HYCLATE 100 MG
100 TABLET ORAL 2 TIMES DAILY
Qty: 14 TABLET | Refills: 0 | Status: SHIPPED | OUTPATIENT
Start: 2024-12-18 | End: 2024-12-25

## 2024-12-18 RX ORDER — BENZONATATE 200 MG/1
200 CAPSULE ORAL 3 TIMES DAILY PRN
Qty: 15 CAPSULE | Refills: 0 | Status: SHIPPED | OUTPATIENT
Start: 2024-12-18

## 2024-12-18 ASSESSMENT — PAIN SCALES - GENERAL: PAINLEVEL_OUTOF10: 0

## 2024-12-18 ASSESSMENT — PAIN - FUNCTIONAL ASSESSMENT: PAIN_FUNCTIONAL_ASSESSMENT: 0-10

## 2024-12-18 NOTE — ED PROVIDER NOTES
[facility] URGENT CARE  EMERGENCY DEPARTMENT ENCOUNTER        NAME: Jordan Rios  :  1960  MRN:  [id number]  Date of evaluation: 2024  Provider: Ricci Zhang PA-C  PCP: Altawil, Badi, MD  Note Started : 9:13 AM EST 24    Chief Complaint: Nasal Congestion, Cough, and Pharyngitis      This is a 64-year-old male that presents to urgent care complaining mostly of sinus URI symptoms for the past couple days he has had a cough which is causing a sore throat.  No shortness of breath.  On first contact patient he appears to be in no acute distress.        Review of Systems  Pertinent positives and negatives are stated within HPI, all other systems reviewed and are negative.     Allergies: Amoxil [amoxicillin] and Augmentin [amoxicillin-pot clavulanate]     --------------------------------------------- PAST HISTORY ---------------------------------------------  Past Medical History:  has a past medical history of A-fib (HCC), Acid reflux, Asthma, Back pain, DDD (degenerative disc disease), cervical, Migraines, and Scoliosis.    Past Surgical History:  has a past surgical history that includes Appendectomy; hernia repair; Cholecystectomy; Colonoscopy; Endoscopy, colon, diagnostic; Nerve Block (Right, 2018); and pr rhinp dfrm w/colum lngth tip only (Right, 3/12/2018).    Social History:  reports that he has never smoked. He has never used smokeless tobacco. He reports that he does not drink alcohol and does not use drugs.    Family History: family history includes Cancer in his brother and paternal grandmother; High Blood Pressure in his father.     The patient’s home medications have been reviewed.    The nursing notes within the ED encounter have been reviewed.     ------------------------------------------------SCREENINGS----------------------------------------------                        CIWA Assessment  BP: 136/76  Pulse: 60

## 2024-12-23 ENCOUNTER — APPOINTMENT (OUTPATIENT)
Dept: RADIOLOGY | Facility: HOSPITAL | Age: 64
End: 2024-12-23
Payer: COMMERCIAL

## 2024-12-26 ENCOUNTER — TELEPHONE (OUTPATIENT)
Dept: CARDIOLOGY | Facility: HOSPITAL | Age: 64
End: 2024-12-26
Payer: COMMERCIAL

## 2024-12-26 NOTE — TELEPHONE ENCOUNTER
Tried to call patient to let them know to the CT is broken and his appt needs rescheduled. Mobile number is not his and home number is disconnected.    Thank you!  Kareem HAYS

## 2024-12-27 ENCOUNTER — TELEPHONE (OUTPATIENT)
Dept: CARDIOLOGY | Facility: HOSPITAL | Age: 64
End: 2024-12-27
Payer: COMMERCIAL

## 2024-12-27 NOTE — TELEPHONE ENCOUNTER
RN called pt at this time regarding ct scan needing rescheduled, both numbers listed are incorrect at this time.

## 2024-12-31 ENCOUNTER — TELEPHONE (OUTPATIENT)
Dept: CARDIOLOGY | Facility: HOSPITAL | Age: 64
End: 2024-12-31
Payer: COMMERCIAL

## 2024-12-31 DIAGNOSIS — R00.2 PALPITATIONS: ICD-10-CM

## 2024-12-31 DIAGNOSIS — I25.9 CHEST PAIN DUE TO MYOCARDIAL ISCHEMIA, UNSPECIFIED ISCHEMIC CHEST PAIN TYPE: ICD-10-CM

## 2024-12-31 NOTE — TELEPHONE ENCOUNTER
RN called pt at this time, refills are at [facility].      ----- Message from Nurse Shwetha JERRY sent at 12/30/2024  3:46 PM EST -----  Regarding: Refill  Patient is requesting a refill of Eliquis to be sent to [facility] in [place] on the [phone]

## 2025-01-07 RX ORDER — BENZONATATE 200 MG/1
1 CAPSULE ORAL EVERY 8 HOURS PRN
COMMUNITY
Start: 2024-12-18

## 2025-01-07 RX ORDER — DOXYCYCLINE HYCLATE 100 MG
1 TABLET ORAL
COMMUNITY
Start: 2024-12-18

## 2025-01-09 ASSESSMENT — ENCOUNTER SYMPTOMS: PALPITATIONS: 1

## 2025-01-09 NOTE — PROGRESS NOTES
"Counseling:  The patient was counseled regarding diagnostic results, instructions for management, risk factor reductions, prognosis, patient and family education, impressions, risks and benefits of treatment options and importance of compliance with treatment.      Chief Complaint:  The patient presents today for 2-month followup of chest pain s/p CTA of the coronary arteries.     History Of Present Illness:    Camden Lance is a 64 y.o. male patient who presents today for 2-month followup of chest pain s/p CTA of the coronary arteries. His PMH is significant for asthma, pulmonary arterial hypertension, lung nodule and LOI. CTA of the coronary arteries performed 01/10/2025 revealed a total CT calcium score of 0 with normal coronary anatomy without evidence of atherosclerotic changes or stenotic disease. Today, the patient states that he is feeling well with no new cardiac complaints. He states that he feels as if he goes into a-fib \"every so often.\"     Past Surgical History:  He has a past surgical history that includes Other surgical history (06/20/2022); Other surgical history (06/20/2022); and Other surgical history (06/20/2022).      Social History:  He reports that he has never smoked. He has never used smokeless tobacco. He reports that he does not currently use alcohol. He reports that he does not currently use drugs.    Family History:  No family history on file.     Allergies:  Amoxicillin and Penicillins    Outpatient Medications:  Current Outpatient Medications   Medication Instructions    albuterol 90 mcg/actuation inhaler Inhale.    albuterol 2.5 mg    ALPRAZolam (XANAX) 0.5 mg, As needed    apixaban (ELIQUIS) 5 mg, oral, 2 times daily    benzonatate (Tessalon) 200 mg capsule 1 capsule, Every 8 hours PRN    doxycycline (Vibra-Tabs) 100 mg tablet 1 tablet, Every 12 hours scheduled (0630,1830)    fluticasone furoate-vilanteroL (Breo Ellipta) 200-25 mcg/dose inhaler Inhale.    ipratropium (Atrovent) 21 " "mcg (0.03 %) nasal spray Administer into affected nostril(s).    metoprolol tartrate (Lopressor) 25 mg tablet Take 1 tablet the night before and one the morning of the test    predniSONE (DELTASONE) 5 mg        Last Recorded Vitals:  Vitals:    01/13/25 1549   BP: 130/76   BP Location: Left arm   Pulse: 78   SpO2: 97%   Weight: 115 kg (254 lb)   Height: 1.905 m (6' 3\")           Review of Systems   Cardiovascular:  Positive for chest pain and palpitations.   All other systems reviewed and are negative.     Physical Exam:  Constitutional:       Appearance: Healthy appearance. Not in distress.   Neck:      Vascular: No JVR. JVD normal.   Pulmonary:      Effort: Pulmonary effort is normal.      Breath sounds: Normal breath sounds. No wheezing. No rhonchi. No rales.   Chest:      Chest wall: Not tender to palpatation.   Cardiovascular:      PMI at left midclavicular line. Normal rate. Regular rhythm. Normal S1. Normal S2.       Murmurs: There is no murmur.      No gallop.  No click. No rub.   Pulses:     Intact distal pulses.   Edema:     Peripheral edema absent.   Abdominal:      General: Bowel sounds are normal.      Palpations: Abdomen is soft.      Tenderness: There is no abdominal tenderness.   Musculoskeletal: Normal range of motion.         General: No tenderness. Skin:     General: Skin is warm and dry.   Neurological:      General: No focal deficit present.      Mental Status: Alert and oriented to person, place and time.       Last Cardiology Tests:  01/10/2025 - CTA Coronary Arteries  Normal coronary anatomy without evidence of atherosclerotic changes or stenotic disease.    10/14/2024 to 10/27/2024 - Holter Monitor  1. Predominant underlying rhythm was sinus rhythm; min HR 44 bpm, max  bpm, avg HR 74 bpm.  2. Slight P wave morphology changes were noted  3. 3 ventricular tachycardia runs occurred; fastest interval 4 beats with max rate 160 bpm, longest lasting 14 beats with avg rate 110 bpm.  4. 189 " "supraventricular tachycardia runs occurred; fastest interval lasting 10 beats with max rate 171 bpm, longest lasting 14.9 secs with avg rate 119 bpm.  4. Isolated SVEs were frequent, SVE couplets were frequent, and SVE triplets were occasional.  5. Isolated VEs were rare, VE couplets were rare, and no VE triplets were present.  6. Ventricular trigeminy was present.    04/12/2024 - Exercise Stress Test  1. ECG: Resting ECG demonstrates normal sinus rhythm with premature atrial complexes.   2. ECG: The stress ECG was negative for ischemia. There were frequent PACs and occasional PVCs.   3. NORMAL TREADMILL STRESS TEST BASED ON ST-SEGMENT     08/30/2022 - Cardiac MRI  1. Normal LV size (EDVi  93ml/m2) with low-normal systolic function (LVEF 53%).  Focal hypokinesis in the basal inferolateral segment.   2. Quantitatively normal RV size (EDVi 91ml/m2) with normal systolic function (RVEF 54%).   3. Multi-focal mid-myocardial late gadolinium enhancement involving the basal lateral segment which likely represent prior myocarditis; however, sarcoidosis remains in the differential diagnosis.    05/17/2022 - TTE  1. Normal LVEF of 56%.  2. Borderline left ventricular hypertrophy.   3. The left ventricle is borderline dilated.  4. Right ventricular cavity is borderline dilated.   5. Trivial tricuspid regurgitation.  6. Moderate pulmonary hypertension.  7. The atrial septum is possibly intact but not seen well enough to rule out an atrial septal defect or PFO.  8. There is trace pericardial effusion but no tamponade.   9. The aortic root is borderline dilated.      Diagnostic review: I have personally reviewed the result(s) of the CTA Coronary Arteries.    Assessment/Plan   1) Atrial Fibrillation   On Eliquis 5 mg BID  Evaluated by Dr. Chong as well as Dr. Banks and was told that he had a-fib   Indicates that Dr. Chong noted an \"erratic\" rhythm, although difficult to interpret - ? Baseline artifact   When evaluated in [place] " "ED, he was found to be in NSR  Reports occasional palpitations  Exercise stress test 04/12/2024 showed SR with PVCs/PACs  FH negative for a-fib  Personal h/o LOI - intolerant of CPAP  I wonder if PACs are being misinterpreted for a-fib   In NSR today by EKG  Will request records from Dr. Chong's office  Holter with 3 runs of v-tach, 189 runs of SVT, frequent isolated SVEs and SVE couplets, occasional SVE triplets, and rare isolated VEs and VE couplets.   Upon personal review of monitor results, episodes of v-tach appear to be atrial tachycardia, and episodes of SVT appear to be intermittent atrial fibrillation.  Patient reports a few episodes of palpitations while wearing the monitor  Reports occasional episodes of a-fib  Referral placed to Dr. Alves, ROSA re: possible ablation   Continue current medical Rx   F/U 6 months    2) Chest Pain  Reports a 3-day history of intermittent chest pain, \"medium\" in severity  CTA coronary arteries 01/10/2025 total CT calcium score of 0, normal coronary anatomy without evidence of atherosclerotic changes or stenotic disease.    3) Hepatic Cyst  CT coronary arteries 01/10/2025 with segment 4 hepatic cyst  Recommend followup with PCP      Scribe Attestation  By signing my name below, I, Tee Rothman   attest that this documentation has been prepared under the direction and in the presence of Ramiro Neal MD.   "

## 2025-01-10 ENCOUNTER — HOSPITAL ENCOUNTER (OUTPATIENT)
Dept: RADIOLOGY | Facility: HOSPITAL | Age: 65
Discharge: HOME | End: 2025-01-10
Payer: COMMERCIAL

## 2025-01-10 VITALS
TEMPERATURE: 97.7 F | WEIGHT: 255.73 LBS | RESPIRATION RATE: 17 BRPM | OXYGEN SATURATION: 97 % | SYSTOLIC BLOOD PRESSURE: 120 MMHG | HEIGHT: 75 IN | HEART RATE: 48 BPM | BODY MASS INDEX: 31.8 KG/M2 | DIASTOLIC BLOOD PRESSURE: 72 MMHG

## 2025-01-10 DIAGNOSIS — R00.2 PALPITATIONS: ICD-10-CM

## 2025-01-10 DIAGNOSIS — I25.9 CHEST PAIN DUE TO MYOCARDIAL ISCHEMIA, UNSPECIFIED ISCHEMIC CHEST PAIN TYPE: ICD-10-CM

## 2025-01-10 LAB
CREAT SERPL-MCNC: 0.9 MG/DL (ref 0.6–1.3)
GFR SERPL CREATININE-BSD FRML MDRD: >90 ML/MIN/1.73M*2

## 2025-01-10 PROCEDURE — 82565 ASSAY OF CREATININE: CPT

## 2025-01-10 PROCEDURE — 2500000001 HC RX 250 WO HCPCS SELF ADMINISTERED DRUGS (ALT 637 FOR MEDICARE OP): Performed by: NURSE PRACTITIONER

## 2025-01-10 PROCEDURE — 75574 CT ANGIO HRT W/3D IMAGE: CPT

## 2025-01-10 PROCEDURE — 2550000001 HC RX 255 CONTRASTS: Performed by: INTERNAL MEDICINE

## 2025-01-10 PROCEDURE — 75574 CT ANGIO HRT W/3D IMAGE: CPT | Performed by: RADIOLOGY

## 2025-01-10 RX ORDER — METOPROLOL TARTRATE 1 MG/ML
5 INJECTION, SOLUTION INTRAVENOUS ONCE AS NEEDED
Status: DISCONTINUED | OUTPATIENT
Start: 2025-01-10 | End: 2025-01-11 | Stop reason: HOSPADM

## 2025-01-10 RX ORDER — LORAZEPAM 2 MG/ML
0.5 INJECTION INTRAMUSCULAR EVERY 5 MIN PRN
Status: DISCONTINUED | OUTPATIENT
Start: 2025-01-10 | End: 2025-01-11 | Stop reason: HOSPADM

## 2025-01-10 RX ORDER — METOPROLOL TARTRATE 1 MG/ML
5 INJECTION, SOLUTION INTRAVENOUS ONCE
Status: DISCONTINUED | OUTPATIENT
Start: 2025-01-10 | End: 2025-01-11 | Stop reason: HOSPADM

## 2025-01-10 RX ORDER — NITROGLYCERIN 0.4 MG/1
0.4 TABLET SUBLINGUAL ONCE
Status: COMPLETED | OUTPATIENT
Start: 2025-01-10 | End: 2025-01-10

## 2025-01-10 RX ADMIN — NITROGLYCERIN 0.4 MG: 0.4 TABLET SUBLINGUAL at 11:25

## 2025-01-10 RX ADMIN — IOHEXOL 95 ML: 350 INJECTION, SOLUTION INTRAVENOUS at 11:25

## 2025-01-10 ASSESSMENT — PAIN - FUNCTIONAL ASSESSMENT: PAIN_FUNCTIONAL_ASSESSMENT: 0-10

## 2025-01-10 ASSESSMENT — PAIN SCALES - GENERAL
PAINLEVEL_OUTOF10: 0 - NO PAIN
PAINLEVEL_OUTOF10: 0 - NO PAIN

## 2025-01-13 ENCOUNTER — OFFICE VISIT (OUTPATIENT)
Dept: CARDIOLOGY | Facility: HOSPITAL | Age: 65
End: 2025-01-13
Payer: COMMERCIAL

## 2025-01-13 ENCOUNTER — APPOINTMENT (OUTPATIENT)
Dept: CARDIOLOGY | Facility: HOSPITAL | Age: 65
End: 2025-01-13
Payer: COMMERCIAL

## 2025-01-13 VITALS
HEART RATE: 78 BPM | DIASTOLIC BLOOD PRESSURE: 76 MMHG | SYSTOLIC BLOOD PRESSURE: 130 MMHG | HEIGHT: 75 IN | WEIGHT: 254 LBS | OXYGEN SATURATION: 97 % | BODY MASS INDEX: 31.58 KG/M2

## 2025-01-13 DIAGNOSIS — R00.2 PALPITATIONS: ICD-10-CM

## 2025-01-13 DIAGNOSIS — I25.9 CHEST PAIN DUE TO MYOCARDIAL ISCHEMIA, UNSPECIFIED ISCHEMIC CHEST PAIN TYPE: ICD-10-CM

## 2025-01-13 PROBLEM — R06.09 DYSPNEA ON EXERTION: Status: ACTIVE | Noted: 2024-10-12

## 2025-01-13 PROBLEM — E66.811 CLASS 1 OBESITY: Status: ACTIVE | Noted: 2024-03-20

## 2025-01-13 PROCEDURE — 99213 OFFICE O/P EST LOW 20 MIN: CPT | Performed by: INTERNAL MEDICINE

## 2025-01-13 PROCEDURE — 3008F BODY MASS INDEX DOCD: CPT | Performed by: INTERNAL MEDICINE

## 2025-01-13 NOTE — PATIENT INSTRUCTIONS
Continue all current medications as prescribed.  Dr. Neal has recommended that you followup with your primary care provider regarding the hepatic cyst seen on your CT scan.   Dr. Neal has placed a referral to an electrophysiologist, electrical specialist of the heart, to discuss further management of atrial fibrillation.   Followup with Dr. Neal in 6 months.    If you have any questions or cardiac concerns, please call our office at [phone].

## 2025-01-13 NOTE — LETTER
"January 13, 2025     Badi Altawil, MD  [address]  [facility]  [facility] [address]    Patient: Camden Lance   YOB: 1960   Date of Visit: 1/13/2025       Dear Dr. Badi Altawil, MD:    Thank you for referring Camden Lance to me for evaluation. Below are my notes for this consultation.  If you have questions, please do not hesitate to call me. I look forward to following your patient along with you.       Sincerely,     Ramiro Neal MD      CC: No Recipients  ______________________________________________________________________________________    Counseling:  The patient was counseled regarding diagnostic results, instructions for management, risk factor reductions, prognosis, patient and family education, impressions, risks and benefits of treatment options and importance of compliance with treatment.      Chief Complaint:  The patient presents today for 2-month followup of chest pain s/p CTA of the coronary arteries.     History Of Present Illness:    Camden Lance is a 64 y.o. male patient who presents today for 2-month followup of chest pain s/p CTA of the coronary arteries. His PMH is significant for asthma, pulmonary arterial hypertension, lung nodule and LOI. CTA of the coronary arteries performed 01/10/2025 revealed a total CT calcium score of 0 with normal coronary anatomy without evidence of atherosclerotic changes or stenotic disease. Today, the patient states that he is feeling well with no new cardiac complaints. He states that he feels as if he goes into a-fib \"every so often.\"     Past Surgical History:  He has a past surgical history that includes Other surgical history (06/20/2022); Other surgical history (06/20/2022); and Other surgical history (06/20/2022).      Social History:  He reports that he has never smoked. He has never used smokeless tobacco. He reports that he does not currently use alcohol. He reports that he does not currently use drugs.    Family History:  No " "family history on file.     Allergies:  Amoxicillin and Penicillins    Outpatient Medications:  Current Outpatient Medications   Medication Instructions   • albuterol 90 mcg/actuation inhaler Inhale.   • albuterol 2.5 mg   • ALPRAZolam (XANAX) 0.5 mg, As needed   • apixaban (ELIQUIS) 5 mg, oral, 2 times daily   • benzonatate (Tessalon) 200 mg capsule 1 capsule, Every 8 hours PRN   • doxycycline (Vibra-Tabs) 100 mg tablet 1 tablet, Every 12 hours scheduled (0630,1830)   • fluticasone furoate-vilanteroL (Breo Ellipta) 200-25 mcg/dose inhaler Inhale.   • ipratropium (Atrovent) 21 mcg (0.03 %) nasal spray Administer into affected nostril(s).   • metoprolol tartrate (Lopressor) 25 mg tablet Take 1 tablet the night before and one the morning of the test   • predniSONE (DELTASONE) 5 mg        Last Recorded Vitals:  Vitals:    01/13/25 1549   BP: 130/76   BP Location: Left arm   Pulse: 78   SpO2: 97%   Weight: 115 kg (254 lb)   Height: 1.905 m (6' 3\")           Review of Systems   Cardiovascular:  Positive for chest pain and palpitations.   All other systems reviewed and are negative.     Physical Exam:  Constitutional:       Appearance: Healthy appearance. Not in distress.   Neck:      Vascular: No JVR. JVD normal.   Pulmonary:      Effort: Pulmonary effort is normal.      Breath sounds: Normal breath sounds. No wheezing. No rhonchi. No rales.   Chest:      Chest wall: Not tender to palpatation.   Cardiovascular:      PMI at left midclavicular line. Normal rate. Regular rhythm. Normal S1. Normal S2.       Murmurs: There is no murmur.      No gallop.  No click. No rub.   Pulses:     Intact distal pulses.   Edema:     Peripheral edema absent.   Abdominal:      General: Bowel sounds are normal.      Palpations: Abdomen is soft.      Tenderness: There is no abdominal tenderness.   Musculoskeletal: Normal range of motion.         General: No tenderness. Skin:     General: Skin is warm and dry.   Neurological:      General: No " focal deficit present.      Mental Status: Alert and oriented to person, place and time.       Last Cardiology Tests:  01/10/2025 - CTA Coronary Arteries  Normal coronary anatomy without evidence of atherosclerotic changes or stenotic disease.    10/14/2024 to 10/27/2024 - Holter Monitor  1. Predominant underlying rhythm was sinus rhythm; min HR 44 bpm, max  bpm, avg HR 74 bpm.  2. Slight P wave morphology changes were noted  3. 3 ventricular tachycardia runs occurred; fastest interval 4 beats with max rate 160 bpm, longest lasting 14 beats with avg rate 110 bpm.  4. 189 supraventricular tachycardia runs occurred; fastest interval lasting 10 beats with max rate 171 bpm, longest lasting 14.9 secs with avg rate 119 bpm.  4. Isolated SVEs were frequent, SVE couplets were frequent, and SVE triplets were occasional.  5. Isolated VEs were rare, VE couplets were rare, and no VE triplets were present.  6. Ventricular trigeminy was present.    04/12/2024 - Exercise Stress Test  1. ECG: Resting ECG demonstrates normal sinus rhythm with premature atrial complexes.   2. ECG: The stress ECG was negative for ischemia. There were frequent PACs and occasional PVCs.   3. NORMAL TREADMILL STRESS TEST BASED ON ST-SEGMENT     08/30/2022 - Cardiac MRI  1. Normal LV size (EDVi  93ml/m2) with low-normal systolic function (LVEF 53%).  Focal hypokinesis in the basal inferolateral segment.   2. Quantitatively normal RV size (EDVi 91ml/m2) with normal systolic function (RVEF 54%).   3. Multi-focal mid-myocardial late gadolinium enhancement involving the basal lateral segment which likely represent prior myocarditis; however, sarcoidosis remains in the differential diagnosis.    05/17/2022 - TTE  1. Normal LVEF of 56%.  2. Borderline left ventricular hypertrophy.   3. The left ventricle is borderline dilated.  4. Right ventricular cavity is borderline dilated.   5. Trivial tricuspid regurgitation.  6. Moderate pulmonary hypertension.  7.  "The atrial septum is possibly intact but not seen well enough to rule out an atrial septal defect or PFO.  8. There is trace pericardial effusion but no tamponade.   9. The aortic root is borderline dilated.      Diagnostic review: I have personally reviewed the result(s) of the CTA Coronary Arteries.    Assessment/Plan  1) Atrial Fibrillation   On Eliquis 5 mg BID  Evaluated by Dr. Chong as well as Dr. Banks and was told that he had a-fib   Indicates that Dr. Chong noted an \"erratic\" rhythm, although difficult to interpret - ? Baseline artifact   When evaluated in [place] ED, he was found to be in NSR  Reports occasional palpitations  Exercise stress test 04/12/2024 showed SR with PVCs/PACs  FH negative for a-fib  Personal h/o LOI - intolerant of CPAP  I wonder if PACs are being misinterpreted for a-fib   In NSR today by EKG  Will request records from Dr. Chong's office  Holter with 3 runs of v-tach, 189 runs of SVT, frequent isolated SVEs and SVE couplets, occasional SVE triplets, and rare isolated VEs and VE couplets.   Upon personal review of monitor results, episodes of v-tach appear to be atrial tachycardia, and episodes of SVT appear to be intermittent atrial fibrillation.  Patient reports a few episodes of palpitations while wearing the monitor  Reports occasional episodes of a-fib  Referral placed to Dr. Alves, ROSA re: possible ablation   Continue current medical Rx   F/U 6 months    2) Chest Pain  Reports a 3-day history of intermittent chest pain, \"medium\" in severity  CTA coronary arteries 01/10/2025 total CT calcium score of 0, normal coronary anatomy without evidence of atherosclerotic changes or stenotic disease.    3) Hepatic Cyst  CT coronary arteries 01/10/2025 with segment 4 hepatic cyst  Recommend followup with PCP      [place] Attestation  By signing my name below, I, Tee Rothman   attest that this documentation has been prepared under the direction and in the presence of Ramiro " MD Ángel.

## 2025-02-07 ENCOUNTER — HOSPITAL ENCOUNTER (EMERGENCY)
Age: 65
Discharge: HOME | End: 2025-02-07
Payer: COMMERCIAL

## 2025-02-07 VITALS
HEART RATE: 96 BPM | RESPIRATION RATE: 20 BRPM | TEMPERATURE: 97.7 F | DIASTOLIC BLOOD PRESSURE: 84 MMHG | OXYGEN SATURATION: 98 % | BODY MASS INDEX: 31.25 KG/M2 | SYSTOLIC BLOOD PRESSURE: 155 MMHG | WEIGHT: 250 LBS

## 2025-02-07 DIAGNOSIS — M62.838 SPASM OF MUSCLE: Primary | ICD-10-CM

## 2025-02-07 LAB
BILIRUB UR QL STRIP: NEGATIVE
CLARITY UR: CLEAR
COLOR UR: ABNORMAL
GLUCOSE UR STRIP-MCNC: NEGATIVE MG/DL
HGB UR QL STRIP.AUTO: ABNORMAL
KETONES UR STRIP-MCNC: NEGATIVE MG/DL
LEUKOCYTE ESTERASE UR QL STRIP: NEGATIVE
NITRITE UR QL STRIP: NEGATIVE
PH UR STRIP: 6 [PH] (ref 5–8)
PROT UR STRIP-MCNC: NEGATIVE MG/DL
RBC #/AREA URNS HPF: ABNORMAL /HPF
SP GR UR STRIP: 1.02 (ref 1–1.03)
UROBILINOGEN UR STRIP-ACNC: 4 EU/DL (ref 0–1)
WBC #/AREA URNS HPF: ABNORMAL /HPF

## 2025-02-07 PROCEDURE — 99211 OFF/OP EST MAY X REQ PHY/QHP: CPT

## 2025-02-07 PROCEDURE — 81001 URINALYSIS AUTO W/SCOPE: CPT

## 2025-02-07 ASSESSMENT — PAIN SCALES - GENERAL: PAINLEVEL_OUTOF10: 7

## 2025-02-07 ASSESSMENT — PAIN - FUNCTIONAL ASSESSMENT: PAIN_FUNCTIONAL_ASSESSMENT: 0-10

## 2025-02-07 NOTE — ED PROVIDER NOTES
[facility] URGENT CARE  EMERGENCY DEPARTMENT ENCOUNTER        NAME: Jordan Rios  :  1960  MRN:  [id number]  Date of evaluation: 2025  Provider: Ricci Zhang PA-C  PCP: Altawil, Badi, MD  Note Started : 6:08 PM EST 25    Chief Complaint: Abdominal Pain (Abd pain all around his waist, BLE weakness, Back side of legs ache, feet numb. Tingling in hands and neck. Started on Monday. ) and Urinary Frequency (Urinary freq that started yesterday with burning. )      This is a 64-year-old male who presents to urgent care complaining of possible urinary tract infection.  He has been having some urinary frequency recently.  He also complains of some abdominal discomfort.  But also complains of muscle crampy pain in his abdomen and back and upper legs.  He denies injury.  He states his feet many times feel numb.  He has history of A-fib he states he is on Eliquis at this time.  He states he is slated to get an ablation done soon in [place].  Currently denies any chest pain but he does admit to palpitations from time to time.  No lightheadedness or dizziness.  On first contact patient he appears to be in no acute distress.        Review of Systems  Pertinent positives and negatives are stated within HPI, all other systems reviewed and are negative.     Allergies: Amoxil [amoxicillin] and Augmentin [amoxicillin-pot clavulanate]     --------------------------------------------- PAST HISTORY ---------------------------------------------  Past Medical History:  has a past medical history of A-fib (HCC), Acid reflux, Asthma, Back pain, DDD (degenerative disc disease), cervical, Migraines, and Scoliosis.    Past Surgical History:  has a past surgical history that includes Appendectomy; hernia repair; Cholecystectomy; Colonoscopy; Endoscopy, colon, diagnostic; Nerve Block (Right, 2018); and pr rhinp dfrm w/colum lngth tip only (Right, 3/12/2018).    Social History:  reports that he has never      -------------------------------------------------PROCEDURES--------------------------------------------  Unless otherwise noted below, none      Procedures      ---EMERGENCY DEPARTMENT COURSE and DIFFERENTIAL DIAGNOSIS/MDM---  (CC/HPI Summary, DDx, ED Course, and Reassessment:) (Disposition Considerations (include 1 Tests not done, Admit vs D/C, Shared Decision Making, Pt Expectation of Test or Tx., Consults, Social Determinants, Chronic Conditiions, Records reviewed)    MDM  Number of Diagnoses or Management Options  Diagnosis management comments: This is a 64-year-old male who presents to urgent care complaining of possible urinary tract infection.  He has been having some urinary frequency recently.  He also complains of some abdominal discomfort.  But also complains of muscle crampy pain in his abdomen and back and upper legs.  He denies injury.  He states his feet many times feel numb.  He has history of A-fib he states he is on Eliquis at this time.  He states he is slated to get an ablation done soon in [place].  Currently denies any chest pain but he does admit to palpitations from time to time.  No lightheadedness or dizziness.  I spoke with the pain and again he does have a history of spinal stenosis.  I wonder if this is playing a part in how he feels with his legs.  I did tell him to take steroids he does state he has some steroids at home he can take also I will place him on a muscle relaxer I told him to see how this medication helps him as well up if he does not see relief over the next several days I told him to seek further help.  Instructions given.         DISCHARGE MEDICATIONS:  New Prescriptions    TIZANIDINE (ZANAFLEX) 4 MG TABLET    Take 1 tablet by mouth 2 times daily as needed (pain/spasm (may cause drowsiness))       DISCONTINUED MEDICATIONS:  Discontinued Medications    No medications on file       PATIENT REFERRED TO:  Altawil, Badi, MD  [address] [facility]

## 2025-02-11 ENCOUNTER — APPOINTMENT (OUTPATIENT)
Dept: RADIOLOGY | Facility: HOSPITAL | Age: 65
End: 2025-02-11
Payer: COMMERCIAL

## 2025-02-11 ENCOUNTER — HOSPITAL ENCOUNTER (EMERGENCY)
Facility: HOSPITAL | Age: 65
Discharge: HOME | End: 2025-02-11
Attending: STUDENT IN AN ORGANIZED HEALTH CARE EDUCATION/TRAINING PROGRAM
Payer: COMMERCIAL

## 2025-02-11 ENCOUNTER — APPOINTMENT (OUTPATIENT)
Dept: CARDIOLOGY | Facility: HOSPITAL | Age: 65
End: 2025-02-11
Payer: COMMERCIAL

## 2025-02-11 VITALS
HEART RATE: 72 BPM | TEMPERATURE: 98.2 F | WEIGHT: 250 LBS | RESPIRATION RATE: 16 BRPM | BODY MASS INDEX: 31.08 KG/M2 | DIASTOLIC BLOOD PRESSURE: 86 MMHG | OXYGEN SATURATION: 97 % | HEIGHT: 75 IN | SYSTOLIC BLOOD PRESSURE: 174 MMHG

## 2025-02-11 DIAGNOSIS — R20.2 PARESTHESIAS: Primary | ICD-10-CM

## 2025-02-11 LAB
ALBUMIN SERPL BCP-MCNC: 4.5 G/DL (ref 3.4–5)
ALP SERPL-CCNC: 72 U/L (ref 33–136)
ALT SERPL W P-5'-P-CCNC: 21 U/L (ref 10–52)
ANION GAP SERPL CALC-SCNC: 9 MMOL/L (ref 10–20)
AST SERPL W P-5'-P-CCNC: 15 U/L (ref 9–39)
BASOPHILS # BLD AUTO: 0.07 X10*3/UL (ref 0–0.1)
BASOPHILS NFR BLD AUTO: 0.6 %
BILIRUB SERPL-MCNC: 0.7 MG/DL (ref 0–1.2)
BUN SERPL-MCNC: 15 MG/DL (ref 6–23)
CALCIUM SERPL-MCNC: 10 MG/DL (ref 8.6–10.3)
CARDIAC TROPONIN I PNL SERPL HS: 10 NG/L (ref 0–20)
CHLORIDE SERPL-SCNC: 104 MMOL/L (ref 98–107)
CO2 SERPL-SCNC: 25 MMOL/L (ref 21–32)
CREAT SERPL-MCNC: 0.84 MG/DL (ref 0.5–1.3)
EGFRCR SERPLBLD CKD-EPI 2021: >90 ML/MIN/1.73M*2
EOSINOPHIL # BLD AUTO: 0.08 X10*3/UL (ref 0–0.7)
EOSINOPHIL NFR BLD AUTO: 0.6 %
ERYTHROCYTE [DISTWIDTH] IN BLOOD BY AUTOMATED COUNT: 13.1 % (ref 11.5–14.5)
GLUCOSE SERPL-MCNC: 107 MG/DL (ref 74–99)
HCT VFR BLD AUTO: 55.2 % (ref 41–52)
HGB BLD-MCNC: 17.8 G/DL (ref 13.5–17.5)
IMM GRANULOCYTES # BLD AUTO: 0.18 X10*3/UL (ref 0–0.7)
IMM GRANULOCYTES NFR BLD AUTO: 1.4 % (ref 0–0.9)
LYMPHOCYTES # BLD AUTO: 1.88 X10*3/UL (ref 1.2–4.8)
LYMPHOCYTES NFR BLD AUTO: 14.9 %
MAGNESIUM SERPL-MCNC: 1.97 MG/DL (ref 1.6–2.4)
MCH RBC QN AUTO: 29 PG (ref 26–34)
MCHC RBC AUTO-ENTMCNC: 32.2 G/DL (ref 32–36)
MCV RBC AUTO: 90 FL (ref 80–100)
MONOCYTES # BLD AUTO: 0.94 X10*3/UL (ref 0.1–1)
MONOCYTES NFR BLD AUTO: 7.4 %
NEUTROPHILS # BLD AUTO: 9.47 X10*3/UL (ref 1.2–7.7)
NEUTROPHILS NFR BLD AUTO: 75.1 %
NRBC BLD-RTO: 0 /100 WBCS (ref 0–0)
PLATELET # BLD AUTO: 279 X10*3/UL (ref 150–450)
POTASSIUM SERPL-SCNC: 4.1 MMOL/L (ref 3.5–5.3)
PROT SERPL-MCNC: 7.2 G/DL (ref 6.4–8.2)
RBC # BLD AUTO: 6.14 X10*6/UL (ref 4.5–5.9)
SODIUM SERPL-SCNC: 134 MMOL/L (ref 136–145)
WBC # BLD AUTO: 12.6 X10*3/UL (ref 4.4–11.3)

## 2025-02-11 PROCEDURE — 99285 EMERGENCY DEPT VISIT HI MDM: CPT | Mod: 25 | Performed by: STUDENT IN AN ORGANIZED HEALTH CARE EDUCATION/TRAINING PROGRAM

## 2025-02-11 PROCEDURE — 36415 COLL VENOUS BLD VENIPUNCTURE: CPT | Performed by: NURSE PRACTITIONER

## 2025-02-11 PROCEDURE — 70450 CT HEAD/BRAIN W/O DYE: CPT | Performed by: RADIOLOGY

## 2025-02-11 PROCEDURE — 85025 COMPLETE CBC W/AUTO DIFF WBC: CPT | Performed by: NURSE PRACTITIONER

## 2025-02-11 PROCEDURE — 93005 ELECTROCARDIOGRAM TRACING: CPT

## 2025-02-11 PROCEDURE — 84484 ASSAY OF TROPONIN QUANT: CPT | Performed by: NURSE PRACTITIONER

## 2025-02-11 PROCEDURE — 70450 CT HEAD/BRAIN W/O DYE: CPT

## 2025-02-11 PROCEDURE — 83735 ASSAY OF MAGNESIUM: CPT | Performed by: NURSE PRACTITIONER

## 2025-02-11 PROCEDURE — 71046 X-RAY EXAM CHEST 2 VIEWS: CPT

## 2025-02-11 PROCEDURE — 84075 ASSAY ALKALINE PHOSPHATASE: CPT | Performed by: NURSE PRACTITIONER

## 2025-02-11 ASSESSMENT — COLUMBIA-SUICIDE SEVERITY RATING SCALE - C-SSRS
6. HAVE YOU EVER DONE ANYTHING, STARTED TO DO ANYTHING, OR PREPARED TO DO ANYTHING TO END YOUR LIFE?: NO
2. HAVE YOU ACTUALLY HAD ANY THOUGHTS OF KILLING YOURSELF?: NO
1. IN THE PAST MONTH, HAVE YOU WISHED YOU WERE DEAD OR WISHED YOU COULD GO TO SLEEP AND NOT WAKE UP?: NO

## 2025-02-11 ASSESSMENT — HEART SCORE
TROPONIN: LESS THAN OR EQUAL TO NORMAL LIMIT
HEART SCORE: 3
HISTORY: SLIGHTLY SUSPICIOUS
ECG: NORMAL
RISK FACTORS: >2 RISK FACTORS OR HX OF ATHEROSCLEROTIC DISEASE
AGE: 45-64

## 2025-02-11 ASSESSMENT — LIFESTYLE VARIABLES
EVER FELT BAD OR GUILTY ABOUT YOUR DRINKING: NO
EVER HAD A DRINK FIRST THING IN THE MORNING TO STEADY YOUR NERVES TO GET RID OF A HANGOVER: NO
HAVE YOU EVER FELT YOU SHOULD CUT DOWN ON YOUR DRINKING: NO
TOTAL SCORE: 0
HAVE PEOPLE ANNOYED YOU BY CRITICIZING YOUR DRINKING: NO

## 2025-02-11 ASSESSMENT — PAIN DESCRIPTION - PAIN TYPE: TYPE: ACUTE PAIN

## 2025-02-11 ASSESSMENT — PAIN - FUNCTIONAL ASSESSMENT: PAIN_FUNCTIONAL_ASSESSMENT: 0-10

## 2025-02-11 ASSESSMENT — PAIN SCALES - GENERAL: PAINLEVEL_OUTOF10: 0 - NO PAIN

## 2025-02-11 NOTE — ED TRIAGE NOTES
Pt presents for all over arms and legs numbness and tingling since Last Thursday. He was at work today and the nurse took his BP and it was high. He has a hx of Afib and is on elequis. States that he sees Dr. Neal. Pt was seen at urgent care on Thursday and was told that it may be a pinched nerve. He was placed on steroids and a muscle relaxer. He has noticed very little improvement. Pt denies any medications that he takes for HTN. He also states that he has had a little bit of blurred vision. Neurologically intact during triage.

## 2025-02-11 NOTE — ED PROVIDER NOTES
Chief Complaint   Patient presents with   • Hypertension   • Tingling       HPI       64 year old male presents to the Emergency Department today complaining of a 4-5 day history of paresthesia in bilateral lower extremities from knees distally, bilateral upper extremities from elbows to hand, and from neck to frontal scalp. Has had generalized weakness and shortness of breath associated with the above. Denies any associated fever, chills, headache, neck pain, chest pain, abdominal pain, nausea, vomiting, diarrhea, constipation, or urinary symptoms. Evaluated at urgent care and diagnosed with lumbar radiculopathy and placed on steroids without improvement.       History provided by:  Patient             Patient History   No past medical history on file.  Past Surgical History:   Procedure Laterality Date   • OTHER SURGICAL HISTORY  06/20/2022    Cholecystectomy   • OTHER SURGICAL HISTORY  06/20/2022    Hernia repair   • OTHER SURGICAL HISTORY  06/20/2022    Appendectomy     No family history on file.  Social History     Tobacco Use   • Smoking status: Never   • Smokeless tobacco: Never   Substance Use Topics   • Alcohol use: Not Currently   • Drug use: Not Currently           Physical Exam  Constitutional:       Appearance: Normal appearance.   HENT:      Head: Normocephalic.      Right Ear: Tympanic membrane, ear canal and external ear normal.      Left Ear: Tympanic membrane, ear canal and external ear normal.      Nose: Nose normal.      Mouth/Throat:      Mouth: Mucous membranes are moist.      Pharynx: Oropharynx is clear. No oropharyngeal exudate or posterior oropharyngeal erythema.   Eyes:      Conjunctiva/sclera: Conjunctivae normal.      Pupils: Pupils are equal, round, and reactive to light.   Cardiovascular:      Rate and Rhythm: Normal rate and regular rhythm.      Pulses:           Radial pulses are 3+ on the right side and 3+ on the left side.        Dorsalis pedis pulses are 3+ on the right side and  3+ on the left side.      Heart sounds: Normal heart sounds. No murmur heard.     No friction rub. No gallop.   Pulmonary:      Effort: Pulmonary effort is normal. No respiratory distress.      Breath sounds: Normal breath sounds. No wheezing, rhonchi or rales.   Abdominal:      General: Abdomen is flat. Bowel sounds are normal.      Palpations: Abdomen is soft.      Tenderness: There is no abdominal tenderness. There is no right CVA tenderness, left CVA tenderness, guarding or rebound. Negative signs include Doss's sign and McBurney's sign.   Musculoskeletal:         General: No swelling or deformity.      Cervical back: Full passive range of motion without pain.      Right lower leg: No edema.      Left lower leg: No edema.   Lymphadenopathy:      Cervical: No cervical adenopathy.   Skin:     Capillary Refill: Capillary refill takes less than 2 seconds.      Coloration: Skin is not jaundiced.      Findings: No rash.   Neurological:      General: No focal deficit present.      Mental Status: He is alert and oriented to person, place, and time. Mental status is at baseline.      Gait: Gait is intact.   Psychiatric:         Mood and Affect: Mood normal.         Behavior: Behavior is cooperative.         Labs Reviewed   CBC WITH AUTO DIFFERENTIAL - Abnormal       Result Value    WBC 12.6 (*)     nRBC 0.0      RBC 6.14 (*)     Hemoglobin 17.8 (*)     Hematocrit 55.2 (*)     MCV 90      MCH 29.0      MCHC 32.2      RDW 13.1      Platelets 279      Neutrophils % 75.1      Immature Granulocytes %, Automated 1.4 (*)     Lymphocytes % 14.9      Monocytes % 7.4      Eosinophils % 0.6      Basophils % 0.6      Neutrophils Absolute 9.47 (*)     Immature Granulocytes Absolute, Automated 0.18      Lymphocytes Absolute 1.88      Monocytes Absolute 0.94      Eosinophils Absolute 0.08      Basophils Absolute 0.07     COMPREHENSIVE METABOLIC PANEL - Abnormal    Glucose 107 (*)     Sodium 134 (*)     Potassium 4.1      Chloride 104       Bicarbonate 25      Anion Gap 9 (*)     Urea Nitrogen 15      Creatinine 0.84      eGFR >90      Calcium 10.0      Albumin 4.5      Alkaline Phosphatase 72      Total Protein 7.2      AST 15      Bilirubin, Total 0.7      ALT 21     MAGNESIUM - Normal    Magnesium 1.97     TROPONIN I, HIGH SENSITIVITY - Normal    Troponin I, High Sensitivity 10      Narrative:     Less than 99th percentile of normal range cutoff-  Female and children under 18 years old <14 ng/L; Male <21 ng/L: Negative  Repeat testing should be performed if clinically indicated.     Female and children under 18 years old 14-50 ng/L; Male 21-50 ng/L:  Consistent with possible cardiac damage and possible increased clinical   risk. Serial measurements may help to assess extent of myocardial damage.     >50 ng/L: Consistent with cardiac damage, increased clinical risk and  myocardial infarction. Serial measurements may help assess extent of   myocardial damage.      NOTE: Children less than 1 year old may have higher baseline troponin   levels and results should be interpreted in conjunction with the overall   clinical context.     NOTE: Troponin I testing is performed using a different   testing methodology at [facility] than at other   [facility]. Direct result comparisons should only   be made within the same method.       XR chest 2 views   Final Result   No sign of acute cardiopulmonary disease or interval change from   12/29/2020.             MACRO:   None        Signed by: Mark Renae 2/11/2025 2:36 PM   Dictation workstation:   [id number]      CT head wo IV contrast   Final Result   No CT evidence of acute intracranial abnormality.             MACRO:   None        Signed by: Mark Renae 2/11/2025 2:36 PM   Dictation workstation:   [id number]               ED Course & MDM   Diagnoses as of 02/11/25 1531   Paresthesias           Medical Decision Making  EKG interpreted by Dr. Newell. Indication:  paresthesias. Findings: NSR with a ventricular rate of 98, normal axis, normal intervals, and no acute ischemic or injury pattern. Impression: No acute pathology.       Patient was seen and evaluated by Dr. Newell. Saline lock was established with labs drawn and results as above. Minimally elevated WBC count of 12.6. Remainder of blood counts, electrolytes, kidney function, and liver function were unremarkable. Heart Score- 3 with normal EKG and troponin. At this time, we feel that the paresthesias are atypical for acute coronary syndrome. We find no underlying evidence of an acute infectious process or pneumothorax on CXR. Clinically, we do not feel they are exhibiting signs of pulmonary embolism or thoracic aortic dissection (no connective tissue disorder, no tachycardia, tachypnea, hypoxia, and mediastinum normal in size on CXR). CT scan of his head without contrast shows no CT evidence of acute intracranial abnormality. Exhibits no signs of CVA or TIA. We are unclear as to the direct etiology of his symptoms. Referred to neurology for follow up Return if worse in any way. Discharged in stable condition with computer instructions.     Diagnostic Impression:    1. Acute paresthesias           Your medication list        ASK your doctor about these medications        Instructions Last Dose Given Next Dose Due   albuterol 90 mcg/actuation inhaler           albuterol 2.5 mg /3 mL (0.083 %) nebulizer solution           ALPRAZolam 0.5 mg tablet  Commonly known as: Xanax           apixaban 5 mg tablet  Commonly known as: Eliquis      Take 1 tablet (5 mg) by mouth 2 times a day.       benzonatate 200 mg capsule  Commonly known as: Tessalon           Breo Ellipta 200-25 mcg/dose inhaler  Generic drug: fluticasone furoate-vilanteroL           doxycycline 100 mg tablet  Commonly known as: Vibra-Tabs           ipratropium 21 mcg (0.03 %) nasal spray  Commonly known as: Atrovent           metoprolol tartrate 25 mg  tablet  Commonly known as: Lopressor      Take 1 tablet the night before and one the morning of the test       predniSONE 5 mg tablet  Commonly known as: Deltasone                      Procedure  Procedures     Lopez Cruz, ONEL-CNP  02/11/25 1533

## 2025-02-12 LAB
ATRIAL RATE: 85 BPM
P AXIS: 63 DEGREES
PR INTERVAL: 144 MS
Q ONSET: 252 MS
QRS COUNT: 15 BEATS
QRS DURATION: 96 MS
QT INTERVAL: 348 MS
QTC CALCULATION(BAZETT): 445 MS
QTC FREDERICIA: 410 MS
R AXIS: 99 DEGREES
T AXIS: 52 DEGREES
T OFFSET: 426 MS
VENTRICULAR RATE: 98 BPM

## 2025-02-24 ENCOUNTER — OFFICE VISIT (OUTPATIENT)
Dept: CARDIOLOGY | Facility: HOSPITAL | Age: 65
End: 2025-02-24
Payer: COMMERCIAL

## 2025-02-24 ENCOUNTER — TELEPHONE (OUTPATIENT)
Dept: CARDIOLOGY | Facility: HOSPITAL | Age: 65
End: 2025-02-24

## 2025-02-24 VITALS
WEIGHT: 250 LBS | HEIGHT: 75 IN | SYSTOLIC BLOOD PRESSURE: 120 MMHG | HEART RATE: 86 BPM | BODY MASS INDEX: 31.08 KG/M2 | DIASTOLIC BLOOD PRESSURE: 78 MMHG

## 2025-02-24 DIAGNOSIS — I25.9 CHEST PAIN DUE TO MYOCARDIAL ISCHEMIA, UNSPECIFIED ISCHEMIC CHEST PAIN TYPE: ICD-10-CM

## 2025-02-24 DIAGNOSIS — I48.0 PAF (PAROXYSMAL ATRIAL FIBRILLATION) (MULTI): ICD-10-CM

## 2025-02-24 DIAGNOSIS — R00.2 PALPITATIONS: Primary | ICD-10-CM

## 2025-02-24 LAB
ATRIAL RATE: 86 BPM
P AXIS: 34 DEGREES
P OFFSET: 186 MS
P ONSET: 135 MS
PR INTERVAL: 150 MS
Q ONSET: 210 MS
QRS COUNT: 14 BEATS
QRS DURATION: 90 MS
QT INTERVAL: 350 MS
QTC CALCULATION(BAZETT): 418 MS
QTC FREDERICIA: 394 MS
R AXIS: 90 DEGREES
T AXIS: 10 DEGREES
T OFFSET: 385 MS
VENTRICULAR RATE: 86 BPM

## 2025-02-24 PROCEDURE — 93005 ELECTROCARDIOGRAM TRACING: CPT | Performed by: INTERNAL MEDICINE

## 2025-02-24 PROCEDURE — 99213 OFFICE O/P EST LOW 20 MIN: CPT | Performed by: INTERNAL MEDICINE

## 2025-02-24 PROCEDURE — 1036F TOBACCO NON-USER: CPT | Performed by: INTERNAL MEDICINE

## 2025-02-24 PROCEDURE — 3008F BODY MASS INDEX DOCD: CPT | Performed by: INTERNAL MEDICINE

## 2025-02-24 PROCEDURE — 93010 ELECTROCARDIOGRAM REPORT: CPT | Performed by: INTERNAL MEDICINE

## 2025-02-24 PROCEDURE — 99213 OFFICE O/P EST LOW 20 MIN: CPT | Mod: 25 | Performed by: INTERNAL MEDICINE

## 2025-02-24 RX ORDER — FLECAINIDE ACETATE 100 MG/1
100 TABLET ORAL 2 TIMES DAILY
Qty: 60 TABLET | Refills: 11 | Status: SHIPPED | OUTPATIENT
Start: 2025-02-24 | End: 2026-02-24

## 2025-02-24 NOTE — TELEPHONE ENCOUNTER
Pt saw Dr. Alves in office 2/24 and he recommended referral to Dr. Shell for PVI as previously discussed with Dr. Neal. Pt given Dr. Shell's office # [phone] to set up virtual visit to discuss procedure. Pt verbalized understanding and agreeable to plan.

## 2025-02-24 NOTE — PROGRESS NOTES
Subjective:  The patient is a 64-year-old white male referred by Dr. Ramiro Neal for consideration of pulmonary vein isolation.  The patient has a history of asthma, obstructive sleep apnea, and cervical spine disease with some migrating hypoesthesias of his arms and legs.  He has had frequent ectopy in the past and been diagnosed at [facility] as having paroxysmal atrial fibrillation, though other physicians have reported that he simply has very frequent extrasystoles.  He underwent a 14-day cardiac event monitor in October 2024, showing a 16.5% PAC burden and some runs of atrial tachycardia often for 5 to 10 seconds at a time that conceivably could have been true fibrillation.  He was placed on Eliquis anticoagulation for prevention of cardioembolic events.  He has not been treated with beta-blockers other than a few doses prior to a CT scan.  He has not been on antiarrhythmic drugs.    The patient has undergone additional cardiac workup, with an LVEF around 55% by multiple studies, and a coronary calcium score of 0.    The patient works as a  at a [employer] custodial center, and hopes to retire in another 2 years.  He plans a vacation in May 2025 with his long-term gentleman friend, I believe.    Current Outpatient Medications   Medication Sig    albuterol 2.5 mg /3 mL (0.083 %) nebulizer solution Take 3 mL (2.5 mg) by nebulization.    albuterol 90 mcg/actuation inhaler Inhale.    ALPRAZolam (Xanax) 0.5 mg tablet Take 1 tablet (0.5 mg) by mouth if needed. (Patient not taking: Reported on 11/18/2024)    apixaban (Eliquis) 5 mg tablet Take 1 tablet (5 mg) by mouth 2 times a day.    benzonatate (Tessalon) 200 mg capsule Take 1 capsule (200 mg) by mouth every 8 hours if needed for cough.    doxycycline (Vibra-Tabs) 100 mg tablet Take 1 tablet (100 mg) by mouth every 12 hours. (Patient not taking: Reported on 1/13/2025)    fluticasone furoate-vilanteroL (Breo Ellipta) 200-25  "mcg/dose inhaler Inhale.    ipratropium (Atrovent) 21 mcg (0.03 %) nasal spray Administer into affected nostril(s). (Patient not taking: Reported on 1/13/2025)    metoprolol tartrate (Lopressor) 25 mg tablet Take 1 tablet the night before and one the morning of the test    predniSONE (Deltasone) 5 mg tablet Take 1 tablet (5 mg) by mouth.     Allergies:  Amoxicillin and Penicillins     Patient Active Problem List   Diagnosis    Abnormal echocardiography    Asthma    Lung nodule    Class 1 obesity    Obstructive sleep apnea syndrome in adult    Pulmonary hypertension (Multi)    Dyspnea on exertion    Achilles tendon tear, right, subsequent encounter    Cervical facet joint syndrome    Cervical radiculopathy    Cervical stenosis of spinal canal    DDD (degenerative disc disease), cervical    Neck pain    Palpitations    Chest pain    Hypoxemia associated with sleep    Irregular heart beat    Mild persistent asthma    Secondary pulmonary arterial hypertension (Multi)     Past Surgical History:   Procedure Laterality Date    OTHER SURGICAL HISTORY  06/20/2022    Cholecystectomy    OTHER SURGICAL HISTORY  06/20/2022    Hernia repair    OTHER SURGICAL HISTORY  06/20/2022    Appendectomy     Objective:  Height:     1.905 m (6' 3\")  Weight: 113 kg (250 lb)     Exam:  Gen: Muscular middle-age gentleman in no distress.  HEENT: Gray goatee; wearing glasses.  Neck: No jugular venous distention or thyromegaly.  Lungs: Clear to auscultation, with no wheezes or rales.  Heart: Irregular rhythm, with extrasystoles every 2-3 beats, but no murmurs noted.  Abdomen: Benign, with no organomegaly or masses.  Extremities: Intact distal pulses; no edema.  Neuro: No focal neurologic abnormalities.  Skin: No cutaneous lesions.    EKG: An EKG today shows underlying sinus rhythm around 80 bpm with very frequent premature atrial complexes, including short runs of atrial tachycardia at around 100 bpm; the QRS complexes show a vertical axis and " poor R wave progression across the precordial leads    Impressions:  1.  Paroxysmal atrial arrhythmias, mainly very frequent premature atrial complexes and perhaps some runs of true paroxysmal atrial fibrillation.  The patient may have a pulmonary venous focus for his frequent ectopy.  The etiology of these rhythms is unclear, perhaps related to asthma or obstructive sleep apnea.  The patient technically has a [id number] score of 0, but he is currently on Eliquis anticoagulation.  He has not been tried on specific antiarrhythmic therapy.  2.  Mild obesity (BMI 31.25) with obstructive sleep apnea, previously on CPAP.  3.  Cervical spine issues, with ongoing neurologic symptoms to be evaluated by Neurology in the near future.  Hopefully, this is not a demyelinating process.  4.  Asthma by history, on inhaler therapy.    Recommendations:  1.  The patient will be started on flecainide 100 mg p.o. twice daily to seek to suppress his atrial ectopy and his presumed runs of atrial fibrillation.  2.  His best long-term option is likely to undergo pulmonary vein isolation, as previously discussed with him by Dr. Neal, and he will be referred to Dr. Shell for consideration of this procedure.  3.  Post-PVI, if his event monitor shows resolution of most of his ectopy, he could likely be weaned off both flecainide and Eliquis.      Jerry Alves MD

## 2025-02-26 NOTE — TELEPHONE ENCOUNTER
Patient called office stating he has continued trying to contact Dr. Shell's office but has not heard back. Patient was informed we will be reaching out via Email to the office and that he should be contacted to schedule. Patient verbalized understanding.

## 2025-03-12 ENCOUNTER — TELEMEDICINE (OUTPATIENT)
Dept: NEUROLOGY | Facility: HOSPITAL | Age: 65
End: 2025-03-12
Payer: COMMERCIAL

## 2025-03-12 DIAGNOSIS — R20.2 PARESTHESIAS: Primary | ICD-10-CM

## 2025-03-12 DIAGNOSIS — R20.0 PERIORAL NUMBNESS: ICD-10-CM

## 2025-03-12 PROCEDURE — 99204 OFFICE O/P NEW MOD 45 MIN: CPT | Performed by: PSYCHIATRY & NEUROLOGY

## 2025-03-12 PROCEDURE — 1036F TOBACCO NON-USER: CPT | Performed by: PSYCHIATRY & NEUROLOGY

## 2025-03-12 RX ORDER — GABAPENTIN 300 MG/1
CAPSULE ORAL
Qty: 60 CAPSULE | Refills: 2 | Status: SHIPPED | OUTPATIENT
Start: 2025-03-12

## 2025-03-12 NOTE — LETTER
March 12, 2025     Badi Altawil, MD  [address]  [place]  [facility] [address]    Patient: Camden Lance   YOB: 1960   Date of Visit: 3/12/2025       Dear Dr. Badi Altawil, MD:    Thank you for referring Camden Lance to me for evaluation. Below are my notes for this consultation.  If you have questions, please do not hesitate to call me. I look forward to following your patient along with you.       Sincerely,     Mark Ravi MD      CC: No Recipients  ______________________________________________________________________________________    Telehealth visit    Visit type: provider referral: Anthony SYKES    PCP: Badi Altawil, MD.    Subjective    Camden Lance is a 64 y.o. year old right handed male who presents with Numbness.    Patient is accompanied by a male in vehicle.     Telehealth visit today. The patient was informed about the telehealth clinical encounter including benefits to avoiding travel, limitations of the assessment, and billing for the service. In-office care may be recommended if needed. Telehealth sessions are not being recorded and personal health information is protected. All questions were answered and verbal consent from the patient (or guardian) was obtained to proceed. Patient verbally confirmed, patient physically in [place].     HPI    Changed to telemedicine visit due to physician not feeling well.    Hx asthma, afib, HTN, cervical stenosis, LOI.     1 mo ago, urgent care evaluation for BLE numbness-->? Lumbar Radiculopathy, given prednisone, did not improve    2/11/25 was at [facility] ED for 4 extremity paresthesias. Head CT wo done did not show acute findings. Discharged for outpatient followup    Was sent by PCP to [facility] Main ER and was seen by Dr Grayson Kenny 2/14/25 for same. CT c-spine with multilevel degen changes. Reported to have normal neuro exam except patchy sensory deficits in BLE, and no hyperreflexia or unilateral weakness/numbness. Clinically  thought symptoms were from cervical and lumbar radiculopathy. For outpatient neuro followup.    States symptoms started about 1 mo ago. Started in legs and feet, numbness and tingling. Spread up in the arms and neck. Facial tingling, perioral mouth and inside nostrils. Feel like somebody is sticking Q tips inside nose. Comes and goes. Tingling in hands and feet there all the time. Later says he was having trouble with BLE weakness as well that had since improved.    Symptoms circumferential below B elbows.  Symptoms circumferential from B knee down.  Above R hip, has hx problem with sacroiliac and having pain thru R butt cheek.     Hx COVID in Dec 2024. Hx flu in Jan 2025. Pt/PCP wondering if this might be due to GBS.    No triggers. None before any of this.    Non-diabetic. No sz. No stroke. No hx cancer     of [facility]. Stressful job.    ? Occipital neuralgia/high cervical radiculopathy symptoms.    Taking B12 supplement, few weeks now, since these started. Not taking B6.    No smoking. No etoh. No street drug use.      Review of Systems   Constitutional:  Negative for appetite change, chills and fever.   HENT:  Negative for ear pain and nosebleeds.    Eyes:  Negative for discharge and itching.   Respiratory:  Negative for choking and chest tightness.    Cardiovascular:  Negative for chest pain and palpitations.   Gastrointestinal:  Negative for abdominal distention, abdominal pain and nausea.   Endocrine: Negative for cold intolerance and heat intolerance.   Genitourinary:  Negative for difficulty urinating and dysuria.   Musculoskeletal:  Negative for gait problem and myalgias.   Neurological:  Negative for dizziness, seizures.     Patient Active Problem List   Diagnosis   • Abnormal echocardiography   • Asthma   • Lung nodule   • Class 1 obesity   • Obstructive sleep apnea syndrome in adult   • Pulmonary hypertension (Multi)   • Dyspnea on exertion   • Achilles tendon tear,  right, subsequent encounter   • Cervical facet joint syndrome   • Cervical radiculopathy   • Cervical stenosis of spinal canal   • DDD (degenerative disc disease), cervical   • Neck pain   • Palpitations   • Chest pain   • Hypoxemia associated with sleep   • Irregular heart beat   • Mild persistent asthma   • Secondary pulmonary arterial hypertension (Multi)   • Paresthesias   • Perioral numbness       History reviewed. No pertinent past medical history.  Past Surgical History:   Procedure Laterality Date   • OTHER SURGICAL HISTORY  06/20/2022    Cholecystectomy   • OTHER SURGICAL HISTORY  06/20/2022    Hernia repair   • OTHER SURGICAL HISTORY  06/20/2022    Appendectomy     Social History     Tobacco Use   • Smoking status: Never   • Smokeless tobacco: Never   Substance Use Topics   • Alcohol use: Not Currently     family history is not on file.    Allergies   Allergen Reactions   • Amoxicillin GI Upset and Nausea And Vomiting   • Penicillins GI Upset and Nausea And Vomiting       Current Outpatient Medications:   •  albuterol 2.5 mg /3 mL (0.083 %) nebulizer solution, Take 3 mL (2.5 mg) by nebulization., Disp: , Rfl:   •  albuterol 90 mcg/actuation inhaler, Inhale., Disp: , Rfl:   •  apixaban (Eliquis) 5 mg tablet, Take 1 tablet (5 mg) by mouth 2 times a day., Disp: 180 tablet, Rfl: 3  •  flecainide (Tambocor) 100 mg tablet, Take 1 tablet (100 mg) by mouth 2 times a day., Disp: 60 tablet, Rfl: 11  •  fluticasone furoate-vilanteroL (Breo Ellipta) 200-25 mcg/dose inhaler, Inhale., Disp: , Rfl:   •  gabapentin (Neurontin) 300 mg capsule, Take 1 cap at night x3 days, then 1 cap 2x/day, Disp: 60 capsule, Rfl: 2    Objective    Vital Signs:  None--telehealth visit    Limited exam, pt on parked vehicle    Awake, alert, oriented x3, in no distress  Well-nourished, seated    Mental status exam as above, conversant   Fair fund of knowledge  Recent/remote memory fair  Fair attention span  Full EOMs intact, no nystagmus, no  ptosis   No facial droop   Hearing grossly intact   No dysarthria    Unable to assess motor strength  Unable to assess Romberg/gait      Lab Results   Component Value Date    WBC 12.6 (H) 02/11/2025    RBC 6.14 (H) 02/11/2025    HGB 17.8 (H) 02/11/2025    HCT 55.2 (H) 02/11/2025     02/11/2025     (L) 02/11/2025    K 4.1 02/11/2025     02/11/2025    BUN 15 02/11/2025    CREATININE 0.84 02/11/2025    EGFR >90 02/11/2025    CALCIUM 10.0 02/11/2025    ALKPHOS 72 02/11/2025    AST 15 02/11/2025    ALT 21 02/11/2025    MG 1.97 02/11/2025    HGBA1C 5.6 04/17/2024        CT cervical spine wo IV contrast 02/14/2025    Narrative  * * *Final Report* * *    DATE OF EXAM: Feb 14 2025  7:59PM    [facility]   [id number]  -  CT CERVICAL SPINE WO IVCON  / ACCESSION #  [id number]    PROCEDURE REASON: Spinal stenosis, cervical    * * * * Physician Interpretation * * * *    EXAMINATION:  CT CERVICAL SPINE WO IVCON    CLINICAL HISTORY:  Spinal stenosis, cervical.  Report numbness and  tingling over the entire body for one week.    TECHNIQUE:  Spiral, high resolution axial unenhanced images were obtained  from the skull base to the cervicothoracic junction with sagittal and  coronal planar reconstructions.  MQ: [id number]    CT Radiation dose: Integrated CT Dose-Length Product (DLP) for this visit  =  443 mGy*cm  CT Dose Reduction Employed: No dose reduction techniques were required    COMPARISON: 02/02/2018 MRI      RESULT:    Counting reference:  Craniocervical junction.   Anatomic Variants:  None.     (topogram) images:  No additional findings.    Alignment:    Alignment is anatomic.    Craniocervical junction:    Craniocervical junction is normal.    Osseous structures/fracture:    No evidence of a lytic or blastic process  in the visualized spine.  No evidence of acute or chronic fracture.  Osseous fusion of the RIGHT C2-C3 facet joints.    Cervical soft tissues:    The paraspinal soft tissues are within  "normal  limits.    C2-C3: Canal and foramina are patent.    C3-C4: Canal and LEFT neuroforamen remain patent.  Moderate RIGHT neural  foraminal narrowing secondary to uncovertebral hypertrophy and facet  arthropathy.    C4-C5: Mild spinal canal and mild to moderate bilateral neural foraminal  narrowing secondary to posterior disc osteophyte complex, bilateral  uncovertebral hypertrophy, and facet arthropathy.    C5-C6: Mild spinal canal and severe bilateral neural foraminal narrowing  secondary to posterior disc osteophyte complex, bilateral uncovertebral  hypertrophy, and facet arthropathy.    C6-C7: Mild spinal canal and moderate to severe bilateral neural  foraminal narrowing secondary to posterior disc osteophyte complex and  bilateral uncovertebral hypertrophy.    C7-T1: Canal and foramina are patent.    Impression  IMPRESSION:    No acute fracture or traumatic malalignment of the cervical spine.    Multilevel degenerative spondylosis without high-grade spinal canal  stenosis.  Multilevel variable up to severe neuroforaminal narrowing at  C5-C7.    Anatomic Variant:  None.  Assume 7 cervical vertebrae with counting from  the craniocervical junction.      : MARCIA  Transcribe Date/Time: Feb 14 2025  8:05P    Dictated by : CHRISTINA MCCOY DO    This examination was interpreted and the report reviewed and  electronically signed by:  LOLITA MCKEON MD on Feb 14 2025  8:41PM  EST      Assessment/Plan    Onset of symptoms with no definite trigger. I don't think this is GBS. I was not able to physically examine pt today, but exam at [facility] ER by neurologist 2 weeks ago or so revealed otherwise \"normal neuro exam\" with no hyperreflexia. Moreover, if this was GBS, by time frame I would expect him to be at [facility] at this time.    Perioral/nostril paresthesias  Circumferential, bilateral distribution atypical for structural neurologic lesion  Discussed  Need to rule out brainstem infarct or poss cranial nerve " mass  ?? Due to anxiety--pt states has high stress job    2.   BUE paresthesias  3.   BLE paresthesias  4.   Cervical spondylosis with cervical disc disease, known  It is possible patient may have similar lumbar spondylosis  Ddx: B cervical radiculopathy +/- entrapment neuropathy, B lumbar radiculopathy +/- peripheral neuropathy  Can try optional symptomatic treatment with gabapentin if wanting, poss SE discussed--pt wants to try      Plans:  Do MRI brain wwo  Do EMG/NCT BUE  Do EMG/NCT BLE  Check B12, B6 levels  Defer MRI cervical/lumbar wo for now  Try gabapentin 300mg at bedtime x3 days, then go up to 2x/day    Rtc after testing    All questions were answered.  Pt knows how to contact my office in case pt has any questions or concerns.    Mark Ravi MD

## 2025-03-12 NOTE — PROGRESS NOTES
Telehealth visit    Visit type: provider referral: Anthony SYKES    PCP: Badi Altawil, MD.    Subjective     Camden Lance is a 64 y.o. year old right handed male who presents with Numbness.    Patient is accompanied by a male in vehicle.     Telehealth visit today. The patient was informed about the telehealth clinical encounter including benefits to avoiding travel, limitations of the assessment, and billing for the service. In-office care may be recommended if needed. Telehealth sessions are not being recorded and personal health information is protected. All questions were answered and verbal consent from the patient (or guardian) was obtained to proceed. Patient verbally confirmed, patient physically in [place].     HPI    Changed to telemedicine visit due to physician not feeling well.    Hx asthma, afib, HTN, cervical stenosis, LOI.     1 mo ago, urgent care evaluation for BLE numbness-->? Lumbar Radiculopathy, given prednisone, did not improve    2/11/25 was at [facility] ED for 4 extremity paresthesias. Head CT wo done did not show acute findings. Discharged for outpatient followup    Was sent by PCP to [facility] Main ER and was seen by Dr Grayson Kenny 2/14/25 for same. CT c-spine with multilevel degen changes. Reported to have normal neuro exam except patchy sensory deficits in BLE, and no hyperreflexia or unilateral weakness/numbness. Clinically thought symptoms were from cervical and lumbar radiculopathy. For outpatient neuro followup.    States symptoms started about 1 mo ago. Started in legs and feet, numbness and tingling. Spread up in the arms and neck. Facial tingling, perioral mouth and inside nostrils. Feel like somebody is sticking Q tips inside nose. Comes and goes. Tingling in hands and feet there all the time. Later says he was having trouble with BLE weakness as well that had since improved.    Symptoms circumferential below B elbows.  Symptoms circumferential from B knee down.  Above R hip, has  hx problem with sacroiliac and having pain thru R butt cheek.     Hx COVID in Dec 2024. Hx flu in Jan 2025. Pt/PCP wondering if this might be due to GBS.    No triggers. None before any of this.    Non-diabetic. No sz. No stroke. No hx cancer     of [facility]. Stressful job.    ? Occipital neuralgia/high cervical radiculopathy symptoms.    Taking B12 supplement, few weeks now, since these started. Not taking B6.    No smoking. No etoh. No street drug use.      Review of Systems   Constitutional:  Negative for appetite change, chills and fever.   HENT:  Negative for ear pain and nosebleeds.    Eyes:  Negative for discharge and itching.   Respiratory:  Negative for choking and chest tightness.    Cardiovascular:  Negative for chest pain and palpitations.   Gastrointestinal:  Negative for abdominal distention, abdominal pain and nausea.   Endocrine: Negative for cold intolerance and heat intolerance.   Genitourinary:  Negative for difficulty urinating and dysuria.   Musculoskeletal:  Negative for gait problem and myalgias.   Neurological:  Negative for dizziness, seizures.     Patient Active Problem List   Diagnosis    Abnormal echocardiography    Asthma    Lung nodule    Class 1 obesity    Obstructive sleep apnea syndrome in adult    Pulmonary hypertension (Multi)    Dyspnea on exertion    Achilles tendon tear, right, subsequent encounter    Cervical facet joint syndrome    Cervical radiculopathy    Cervical stenosis of spinal canal    DDD (degenerative disc disease), cervical    Neck pain    Palpitations    Chest pain    Hypoxemia associated with sleep    Irregular heart beat    Mild persistent asthma    Secondary pulmonary arterial hypertension (Multi)    Paresthesias    Perioral numbness       History reviewed. No pertinent past medical history.  Past Surgical History:   Procedure Laterality Date    OTHER SURGICAL HISTORY  06/20/2022    Cholecystectomy    OTHER SURGICAL HISTORY   06/20/2022    Hernia repair    OTHER SURGICAL HISTORY  06/20/2022    Appendectomy     Social History     Tobacco Use    Smoking status: Never    Smokeless tobacco: Never   Substance Use Topics    Alcohol use: Not Currently     family history is not on file.    Allergies   Allergen Reactions    Amoxicillin GI Upset and Nausea And Vomiting    Penicillins GI Upset and Nausea And Vomiting       Current Outpatient Medications:     albuterol 2.5 mg /3 mL (0.083 %) nebulizer solution, Take 3 mL (2.5 mg) by nebulization., Disp: , Rfl:     albuterol 90 mcg/actuation inhaler, Inhale., Disp: , Rfl:     apixaban (Eliquis) 5 mg tablet, Take 1 tablet (5 mg) by mouth 2 times a day., Disp: 180 tablet, Rfl: 3    flecainide (Tambocor) 100 mg tablet, Take 1 tablet (100 mg) by mouth 2 times a day., Disp: 60 tablet, Rfl: 11    fluticasone furoate-vilanteroL (Breo Ellipta) 200-25 mcg/dose inhaler, Inhale., Disp: , Rfl:     gabapentin (Neurontin) 300 mg capsule, Take 1 cap at night x3 days, then 1 cap 2x/day, Disp: 60 capsule, Rfl: 2    Objective     Vital Signs:  None--telehealth visit    Limited exam, pt on parked vehicle    Awake, alert, oriented x3, in no distress  Well-nourished, seated    Mental status exam as above, conversant   Fair fund of knowledge  Recent/remote memory fair  Fair attention span  Full EOMs intact, no nystagmus, no ptosis   No facial droop   Hearing grossly intact   No dysarthria    Unable to assess motor strength  Unable to assess Romberg/gait      Lab Results   Component Value Date    WBC 12.6 (H) 02/11/2025    RBC 6.14 (H) 02/11/2025    HGB 17.8 (H) 02/11/2025    HCT 55.2 (H) 02/11/2025     02/11/2025     (L) 02/11/2025    K 4.1 02/11/2025     02/11/2025    BUN 15 02/11/2025    CREATININE 0.84 02/11/2025    EGFR >90 02/11/2025    CALCIUM 10.0 02/11/2025    ALKPHOS 72 02/11/2025    AST 15 02/11/2025    ALT 21 02/11/2025    MG 1.97 02/11/2025    HGBA1C 5.6 04/17/2024        CT cervical spine  wo IV contrast 02/14/2025    Narrative  * * *Final Report* * *    DATE OF EXAM: Feb 14 2025  7:59PM    [facility]   [id number]  -  CT CERVICAL SPINE WO IVCON  / ACCESSION #  [id number]    PROCEDURE REASON: Spinal stenosis, cervical    * * * * Physician Interpretation * * * *    EXAMINATION:  CT CERVICAL SPINE WO IVCON    CLINICAL HISTORY:  Spinal stenosis, cervical.  Report numbness and  tingling over the entire body for one week.    TECHNIQUE:  Spiral, high resolution axial unenhanced images were obtained  from the skull base to the cervicothoracic junction with sagittal and  coronal planar reconstructions.  MQ: [id number]    CT Radiation dose: Integrated CT Dose-Length Product (DLP) for this visit  =  443 mGy*cm  CT Dose Reduction Employed: No dose reduction techniques were required    COMPARISON: 02/02/2018 MRI      RESULT:    Counting reference:  Craniocervical junction.   Anatomic Variants:  None.     (topogram) images:  No additional findings.    Alignment:    Alignment is anatomic.    Craniocervical junction:    Craniocervical junction is normal.    Osseous structures/fracture:    No evidence of a lytic or blastic process  in the visualized spine.  No evidence of acute or chronic fracture.  Osseous fusion of the RIGHT C2-C3 facet joints.    Cervical soft tissues:    The paraspinal soft tissues are within normal  limits.    C2-C3: Canal and foramina are patent.    C3-C4: Canal and LEFT neuroforamen remain patent.  Moderate RIGHT neural  foraminal narrowing secondary to uncovertebral hypertrophy and facet  arthropathy.    C4-C5: Mild spinal canal and mild to moderate bilateral neural foraminal  narrowing secondary to posterior disc osteophyte complex, bilateral  uncovertebral hypertrophy, and facet arthropathy.    C5-C6: Mild spinal canal and severe bilateral neural foraminal narrowing  secondary to posterior disc osteophyte complex, bilateral uncovertebral  hypertrophy, and facet arthropathy.    C6-C7: Mild spinal canal  "and moderate to severe bilateral neural  foraminal narrowing secondary to posterior disc osteophyte complex and  bilateral uncovertebral hypertrophy.    C7-T1: Canal and foramina are patent.    Impression  IMPRESSION:    No acute fracture or traumatic malalignment of the cervical spine.    Multilevel degenerative spondylosis without high-grade spinal canal  stenosis.  Multilevel variable up to severe neuroforaminal narrowing at  C5-C7.    Anatomic Variant:  None.  Assume 7 cervical vertebrae with counting from  the craniocervical junction.      : MARCIA  Transcribe Date/Time: Feb 14 2025  8:05P    Dictated by : CHRISTINA MCCOY DO    This examination was interpreted and the report reviewed and  electronically signed by:  LOLITA MCKEON MD on Feb 14 2025  8:41PM  EST      Assessment/Plan     Onset of symptoms with no definite trigger. I don't think this is GBS. I was not able to physically examine pt today, but exam at [facility] ER by neurologist 2 weeks ago or so revealed otherwise \"normal neuro exam\" with no hyperreflexia. Moreover, if this was GBS, by time frame I would expect him to be at [facility] at this time.    Perioral/nostril paresthesias  Circumferential, bilateral distribution atypical for structural neurologic lesion  Discussed  Need to rule out brainstem infarct or poss cranial nerve mass  ?? Due to anxiety--pt states has high stress job    2.   BUE paresthesias  3.   BLE paresthesias  4.   Cervical spondylosis with cervical disc disease, known  It is possible patient may have similar lumbar spondylosis  Ddx: B cervical radiculopathy +/- entrapment neuropathy, B lumbar radiculopathy +/- peripheral neuropathy  Can try optional symptomatic treatment with gabapentin if wanting, poss SE discussed--pt wants to try      Plans:  Do MRI brain wwo  Do EMG/NCT BUE  Do EMG/NCT BLE  Check B12, B6 levels  Defer MRI cervical/lumbar wo for now  Try gabapentin 300mg at bedtime x3 days, then go up to 2x/day    Rtc " after testing    All questions were answered.  Pt knows how to contact my office in case pt has any questions or concerns.    Mark Ravi MD

## 2025-03-12 NOTE — PATIENT INSTRUCTIONS
Do MRI brain wwo  Do EMG/NCT BUE  Do EMG/NCT BLE  Check B12, B6 levels  Defer MRI cervical/lumbar wo for now  Try gabapentin 300mg at bedtime x3 days, then go up to 2x/day    Rtc after testing

## 2025-04-02 NOTE — PROGRESS NOTES
Cardiac Electrophysiology Office Visit     Current Outpatient Medications   Medication Instructions   • albuterol 90 mcg/actuation inhaler Inhale.   • albuterol 2.5 mg   • apixaban (ELIQUIS) 5 mg, oral, 2 times daily   • flecainide (TAMBOCOR) 100 mg, oral, 2 times daily   • fluticasone furoate-vilanteroL (Breo Ellipta) 200-25 mcg/dose inhaler Inhale.   • gabapentin (Neurontin) 300 mg capsule Take 1 cap at night x3 days, then 1 cap 2x/day      Subjective    Camden Lance is a 64 y.o. male .    Chief Complaint   Patient presents with   • Atrial Fibrillation   • PAC's      HPI     Patient is referred by Jerry Alves MD for consideration of catheter ablation.     PMH includes: palpitations, asthma, obesity, LOI, pulmonary hypertension, DDD, cervical stenosis    The patient follows with Dr. Ramiro Neal and has a history of paroxysmal atrial arrhythmias, previously described by some providers as paroxysmal atrial fibrillation, and by others as very frequent premature atrial complexes.   He underwent a 14-day cardiac event monitor in October 2024, which revealed a 16.5% PAC burden and multiple short runs of atrial tachycardia, some lasting 5-10 seconds, possibly consistent with brief episodes of AF. Given these findings, he was started on Eliquis for cardioembolic risk reduction, despite having a [id number]-OGQd score of 0.    He has not been treated with beta-blockers or antiarrhythmic drugs. On 02/24/25, he was started on Flecainide 100 mg BID to suppress ectopy and presumed paroxysmal AF. Per Dr. Alves, if repeat monitoring post-pulmonary vein isolation demonstrates resolution of ectopy, discontinuation of both Flecainide and Eliquis may be considered.    Dr. Alves believes the likely source of ectopy may be pulmonary venous in origin, with contributing factors possibly including asthma or obstructive sleep apnea.    The patient presents today to discuss catheter ablation as a definitive treatment  option for his symptomatic atrial arrhythmias and high ectopic burden.    RELEVANT TESTING:     CT CALCIUM SCORE 01/10/25:  LM 0,  LAD 0,  LCx 0,  RCA 0,      Total 0    EVENT MONITOR 10/23/24: 14 days  Min HR 44 bpm, max  bpm, avg 74 bpm, predominantly sinus rhythm, slight P wave morphology changes were noted, 3 VT runs longest lasting 4 beats at a rate of 160 bpm, longest lasting 14 beats with rate of 110 bpm. 189 SVT runs with the longest interval lasting 10 beats at 171 bpm and longest lasting 14.9 sec at 119 bpm, frequest SVE 16.5% burden, SVE couplets frequent at 8.6% burden.     STRESS TEST 04/12/24:  Stress ECG   Arrhythmias during stress: frequent PACs, occasional unifocal PVCs.   No ST deviation was noted. The ECG was negative for ischemia.     ECHO 05/17/2022:  EF 56%, moderate pulmonary hypertension, atrial septum is possibly intact but not seen well enough to rule out a atrial septal defect or PFO, consider a bubble study    Lab Review:   Lab Results   Component Value Date    WBC 12.6 (H) 02/11/2025    HGB 17.8 (H) 02/11/2025    HCT 55.2 (H) 02/11/2025     02/11/2025    ALT 21 02/11/2025    AST 15 02/11/2025     (L) 02/11/2025    K 4.1 02/11/2025     02/11/2025    CREATININE 0.84 02/11/2025    BUN 15 02/11/2025    CO2 25 02/11/2025    HGBA1C 5.6 04/17/2024       Review of Systems   Constitutional: Negative.   HENT: Negative.     Eyes: Negative.    Cardiovascular:  Positive for dyspnea on exertion, irregular heartbeat and palpitations.   Respiratory: Negative.     Endocrine: Negative.    Skin: Negative.    Musculoskeletal: Negative.    Gastrointestinal: Negative.    Genitourinary: Negative.    Neurological: Negative.    Psychiatric/Behavioral: Negative.          Objective    Constitutional:       Appearance: Healthy appearance. Not in distress.   Eyes:      Pupils: Pupils are equal, round, and reactive to light.   Neck:      Thyroid: Thyroid normal.      Vascular: JVD normal.    Pulmonary:      Effort: Pulmonary effort is normal.      Breath sounds: Normal breath sounds.   Cardiovascular:      Normal rate. Regular rhythm. S1 with normal intensity. S2 with normal intensity.       Murmurs: There is no murmur.      No gallop.  No click. No rub.   Musculoskeletal: Normal range of motion.      Cervical back: Normal range of motion and neck supple. Skin:     General: Skin is warm and dry.   Neurological:      General: No focal deficit present.      Mental Status: Alert and oriented to person, place and time.      Motor: Motor function is intact.      Gait: Gait is intact.      ASSESSMENT / PLAN:  WE DISCUSSED THE RISKS AND BENEFITS OF CATHETER ABLATION FOR PAROXYSMAL AF.  HE UNDERSTANDS AND WANTS TO PROCEED WITH ABLATION.    SCHEDULE AF ABLATION UNDER GENERAL ANESTHESIA @ [facility]  HOLD ALL MEDS IN THE MORNING OF ABLATION INCLUDING ELIQUIS  CARTO SYSTEM    DC FLECAINIDE 2 MO POST ABLATION   DC ELIQUIS 3 MO POST ABLATION    MD Deondre Alonso Master Clinician of Cardiovascular [place].   [facility] [place].   Director of Electrophysiology Center  Professor of Medicine.   [employer].

## 2025-04-03 ENCOUNTER — DOCUMENTATION (OUTPATIENT)
Dept: NEUROLOGY | Facility: HOSPITAL | Age: 65
End: 2025-04-03
Payer: COMMERCIAL

## 2025-04-07 ENCOUNTER — OFFICE VISIT (OUTPATIENT)
Dept: CARDIOLOGY | Facility: CLINIC | Age: 65
End: 2025-04-07
Payer: COMMERCIAL

## 2025-04-07 VITALS
DIASTOLIC BLOOD PRESSURE: 88 MMHG | HEART RATE: 73 BPM | OXYGEN SATURATION: 94 % | SYSTOLIC BLOOD PRESSURE: 134 MMHG | BODY MASS INDEX: 31.79 KG/M2 | HEIGHT: 75 IN | WEIGHT: 255.7 LBS

## 2025-04-07 DIAGNOSIS — R00.2 PALPITATIONS: Primary | ICD-10-CM

## 2025-04-07 DIAGNOSIS — Z01.818 PREOP TESTING: ICD-10-CM

## 2025-04-07 DIAGNOSIS — I48.0 PAF (PAROXYSMAL ATRIAL FIBRILLATION) (MULTI): ICD-10-CM

## 2025-04-07 LAB
ATRIAL RATE: 73 BPM
P AXIS: 49 DEGREES
P OFFSET: 173 MS
P ONSET: 115 MS
PR INTERVAL: 186 MS
Q ONSET: 208 MS
QRS COUNT: 12 BEATS
QRS DURATION: 102 MS
QT INTERVAL: 408 MS
QTC CALCULATION(BAZETT): 449 MS
QTC FREDERICIA: 435 MS
R AXIS: 60 DEGREES
T AXIS: 50 DEGREES
T OFFSET: 412 MS
VENTRICULAR RATE: 73 BPM

## 2025-04-07 PROCEDURE — 1036F TOBACCO NON-USER: CPT | Performed by: INTERNAL MEDICINE

## 2025-04-07 PROCEDURE — 93005 ELECTROCARDIOGRAM TRACING: CPT | Performed by: INTERNAL MEDICINE

## 2025-04-07 PROCEDURE — 3008F BODY MASS INDEX DOCD: CPT | Performed by: INTERNAL MEDICINE

## 2025-04-07 PROCEDURE — 99214 OFFICE O/P EST MOD 30 MIN: CPT | Performed by: INTERNAL MEDICINE

## 2025-04-07 PROCEDURE — 99204 OFFICE O/P NEW MOD 45 MIN: CPT | Performed by: INTERNAL MEDICINE

## 2025-04-07 RX ORDER — SOD SULF/POT CHLORIDE/MAG SULF 1.479 G
TABLET ORAL
COMMUNITY
Start: 2025-03-27

## 2025-04-07 ASSESSMENT — ENCOUNTER SYMPTOMS
EYES NEGATIVE: 1
DEPRESSION: 0
RESPIRATORY NEGATIVE: 1
GASTROINTESTINAL NEGATIVE: 1
ENDOCRINE NEGATIVE: 1
PALPITATIONS: 1
NEUROLOGICAL NEGATIVE: 1
IRREGULAR HEARTBEAT: 1
MUSCULOSKELETAL NEGATIVE: 1
LOSS OF SENSATION IN FEET: 1
OCCASIONAL FEELINGS OF UNSTEADINESS: 0
CONSTITUTIONAL NEGATIVE: 1
PSYCHIATRIC NEGATIVE: 1
DYSPNEA ON EXERTION: 1

## 2025-04-07 ASSESSMENT — PATIENT HEALTH QUESTIONNAIRE - PHQ9
1. LITTLE INTEREST OR PLEASURE IN DOING THINGS: NOT AT ALL
SUM OF ALL RESPONSES TO PHQ9 QUESTIONS 1 AND 2: 0
2. FEELING DOWN, DEPRESSED OR HOPELESS: NOT AT ALL

## 2025-04-07 ASSESSMENT — PAIN SCALES - GENERAL: PAINLEVEL_OUTOF10: 0-NO PAIN

## 2025-04-13 ENCOUNTER — DOCUMENTATION (OUTPATIENT)
Dept: NEUROLOGY | Facility: HOSPITAL | Age: 65
End: 2025-04-13
Payer: COMMERCIAL

## 2025-04-14 NOTE — PROGRESS NOTES
Neuro Brief Note  Received outside lab from [facility] dated 4/2/25 vit B6 21.9 ()  Await other testing  Dr lala

## 2025-04-19 ENCOUNTER — LAB (OUTPATIENT)
Dept: LAB | Facility: HOSPITAL | Age: 65
End: 2025-04-19
Payer: COMMERCIAL

## 2025-04-19 DIAGNOSIS — Z01.818 ENCOUNTER FOR OTHER PREPROCEDURAL EXAMINATION: ICD-10-CM

## 2025-04-19 DIAGNOSIS — R00.2 PALPITATIONS: Primary | ICD-10-CM

## 2025-04-19 DIAGNOSIS — I48.0 PAROXYSMAL ATRIAL FIBRILLATION (MULTI): ICD-10-CM

## 2025-04-19 PROCEDURE — 86901 BLOOD TYPING SEROLOGIC RH(D): CPT

## 2025-04-19 PROCEDURE — 86850 RBC ANTIBODY SCREEN: CPT

## 2025-04-19 PROCEDURE — 86900 BLOOD TYPING SEROLOGIC ABO: CPT

## 2025-04-20 LAB
ABO GROUP (TYPE) IN BLOOD: NORMAL
ANION GAP SERPL CALCULATED.4IONS-SCNC: 11 MMOL/L (CALC) (ref 7–17)
ANTIBODY SCREEN: NORMAL
BUN SERPL-MCNC: 15 MG/DL (ref 7–25)
BUN/CREAT SERPL: NORMAL (CALC) (ref 6–22)
CALCIUM SERPL-MCNC: 9.3 MG/DL (ref 8.6–10.3)
CHLORIDE SERPL-SCNC: 104 MMOL/L (ref 98–110)
CO2 SERPL-SCNC: 26 MMOL/L (ref 20–32)
CREAT SERPL-MCNC: 0.85 MG/DL (ref 0.7–1.35)
EGFRCR SERPLBLD CKD-EPI 2021: 98 ML/MIN/1.73M2
ERYTHROCYTE [DISTWIDTH] IN BLOOD BY AUTOMATED COUNT: 13.2 % (ref 11–15)
GLUCOSE SERPL-MCNC: 72 MG/DL (ref 65–139)
HCT VFR BLD AUTO: 49.1 % (ref 38.5–50)
HGB BLD-MCNC: 16.6 G/DL (ref 13.2–17.1)
MCH RBC QN AUTO: 30.1 PG (ref 27–33)
MCHC RBC AUTO-ENTMCNC: 33.8 G/DL (ref 32–36)
MCV RBC AUTO: 88.9 FL (ref 80–100)
PLATELET # BLD AUTO: 248 THOUSAND/UL (ref 140–400)
PMV BLD REES-ECKER: 10.1 FL (ref 7.5–12.5)
POTASSIUM SERPL-SCNC: 4.2 MMOL/L (ref 3.5–5.3)
RBC # BLD AUTO: 5.52 MILLION/UL (ref 4.2–5.8)
RH FACTOR (ANTIGEN D): NORMAL
SODIUM SERPL-SCNC: 141 MMOL/L (ref 135–146)
WBC # BLD AUTO: 8.1 THOUSAND/UL (ref 3.8–10.8)

## 2025-05-08 ENCOUNTER — ANESTHESIA EVENT (OUTPATIENT)
Dept: CARDIOLOGY | Facility: HOSPITAL | Age: 65
End: 2025-05-08
Payer: COMMERCIAL

## 2025-05-08 NOTE — ANESTHESIA PREPROCEDURE EVALUATION
Patient: Camden Lance    Procedure Information       Date/Time: 05/09/25 0730    Procedure: Ablation A-Fib Paroxysmal - General Anesthesia, EPS, 3D with CARTO/ CPT 86711    Location: [facility] / [facility] Cardiac Cath Lab    Providers: Juan Pablo Shell MD            Relevant Problems   Cardiac   (+) Chest pain   (+) PAF (paroxysmal atrial fibrillation) (Multi)      Pulmonary   (+) Asthma   (+) Dyspnea on exertion   (+) Mild persistent asthma   (+) Pulmonary hypertension (Multi)   (+) Secondary pulmonary arterial hypertension (Multi)      Neuro   (+) Cervical radiculopathy      Musculoskeletal   (+) Cervical stenosis of spinal canal       Clinical information reviewed:   Tobacco  Allergies  Meds   Med Hx  Surg Hx   Fam Hx  Soc Hx         Medical History[1]   Surgical History[2]  Social History[3]   Current Outpatient Medications   Medication Instructions    albuterol 90 mcg/actuation inhaler Inhale.    albuterol 2.5 mg    apixaban (ELIQUIS) 5 mg, oral, 2 times daily    flecainide (TAMBOCOR) 100 mg, oral, 2 times daily    fluticasone furoate-vilanteroL (Breo Ellipta) 200-25 mcg/dose inhaler Inhale.    gabapentin (Neurontin) 300 mg capsule Take 1 cap at night x3 days, then 1 cap 2x/day    omeprazole (PRILOSEC) 40 mg, Daily before breakfast    Sutab 1.479-0.188- 0.225 gram tablet tablet use as directed      RX Allergies[4]     Chemistry    Lab Results   Component Value Date/Time     04/19/2025 1028    K 4.2 04/19/2025 1028     04/19/2025 1028    CO2 26 04/19/2025 1028    BUN 15 04/19/2025 1028    CREATININE 0.85 04/19/2025 1028    Lab Results   Component Value Date/Time    CALCIUM 9.3 04/19/2025 1028    ALKPHOS 72 02/11/2025 1357    AST 15 02/11/2025 1357    ALT 21 02/11/2025 1357    BILITOT 0.7 02/11/2025 1357          Lab Results   Component Value Date    HGBA1C 5.6 04/17/2024     Lab Results   Component Value Date/Time    WBC 8.1 04/19/2025 1028    HGB 16.6 04/19/2025 1028    HCT 49.1  "04/19/2025 1028     04/19/2025 1028     No results found for: \"PROTIME\", \"PTT\", \"INR\"  No results found for: \"ABORH\"  Encounter Date: 04/07/25   ECG 12 lead (Clinic Performed)   Result Value    Ventricular Rate 73    Atrial Rate 73    DE Interval 186    QRS Duration 102    QT Interval 408    QTC Calculation(Bazett) 449    P Axis 49    R Axis 60    T Axis 50    QRS Count 12    Q Onset 208    P Onset 115    P Offset 173    T Offset 412    QTC Fredericia 435    Narrative    Sinus rhythm with marked sinus arrhythmia  Otherwise normal ECG  When compared with ECG of 24-FEB-2025 08:13,  Premature supraventricular complexes are no longer Present  T wave inversion no longer evident in Inferior leads  Confirmed by Suresh Coronel ([id number]) on 4/14/2025 1:07:36 PM     No results found for this or any previous visit from the past 1095 days.    Exercise stress 4/12/2024@[facility]:   Narrative      ECG: Resting ECG demonstrates normal sinus rhythm with premature atrial  complexes..    ECG: The stress ECG was negative for ischemia. There were frequent PACs  and occasional PVCs.    NORMAL TREADMILL STRESS TEST BASED ON ST-SEGMENT    Echo 5/17/2022@[facility]:  EF 56%, normal RV systolic function, trivial TR, RVSP 51 mmHg.    Visit Vitals  /82   Pulse 81   Temp 35.8 °C (96.4 °F)   Resp 16   SpO2 96%   Smoking Status Never     NPO/Void Status  Date of Last Liquid: 05/08/25  Date of Last Solid: 05/08/25        Physical Exam    Airway  Mallampati: III  TM distance: >3 FB  Neck ROM: full  Mouth opening: 3 or more finger widths     Cardiovascular - normal exam  Rhythm: regular  Rate: normal     Dental    Pulmonary - normal exam   Abdominal - normal exam           Anesthesia Plan    History of general anesthesia?: yes  History of complications of general anesthesia?: no    ASA 3     general   (GETA with standard ASA monitoring, joe thayer)  intravenous induction   Postoperative pain plan includes opioids.  Anesthetic plan and " risks discussed with patient.    Plan discussed with CAA and CRNA.             [1]   Past Medical History:  Diagnosis Date    Asthma     Atrial fibrillation (Multi)     Hypertension     Migraines     Pulmonary HTN (Multi)     RVSP 51 mmHg on echo 5/17/2022    Sleep apnea    [2]   Past Surgical History:  Procedure Laterality Date    APPENDECTOMY      CHOLECYSTECTOMY      COLONOSCOPY      HERNIA REPAIR     [3]   Social History  Tobacco Use    Smoking status: Never     Passive exposure: Never    Smokeless tobacco: Never   Vaping Use    Vaping status: Never Used   Substance Use Topics    Alcohol use: Not Currently    Drug use: Not Currently   [4]   Allergies  Allergen Reactions    Amoxicillin GI Upset and Nausea And Vomiting    Penicillins GI Upset and Nausea And Vomiting

## 2025-05-09 ENCOUNTER — HOSPITAL ENCOUNTER (OUTPATIENT)
Dept: CARDIOLOGY | Facility: HOSPITAL | Age: 65
Setting detail: OUTPATIENT SURGERY
Discharge: HOME | End: 2025-05-09
Payer: COMMERCIAL

## 2025-05-09 ENCOUNTER — ANESTHESIA (OUTPATIENT)
Dept: CARDIOLOGY | Facility: HOSPITAL | Age: 65
End: 2025-05-09
Payer: COMMERCIAL

## 2025-05-09 ENCOUNTER — HOSPITAL ENCOUNTER (OUTPATIENT)
Facility: HOSPITAL | Age: 65
Setting detail: OUTPATIENT SURGERY
Discharge: HOME | End: 2025-05-09
Attending: INTERNAL MEDICINE | Admitting: INTERNAL MEDICINE
Payer: COMMERCIAL

## 2025-05-09 VITALS
TEMPERATURE: 96.8 F | OXYGEN SATURATION: 96 % | RESPIRATION RATE: 16 BRPM | HEART RATE: 74 BPM | DIASTOLIC BLOOD PRESSURE: 66 MMHG | SYSTOLIC BLOOD PRESSURE: 126 MMHG

## 2025-05-09 DIAGNOSIS — R00.2 PALPITATIONS: Primary | ICD-10-CM

## 2025-05-09 DIAGNOSIS — Z98.890 STATUS POST ABLATION OF ATRIAL FIBRILLATION: ICD-10-CM

## 2025-05-09 DIAGNOSIS — I48.0 PAF (PAROXYSMAL ATRIAL FIBRILLATION) (MULTI): ICD-10-CM

## 2025-05-09 DIAGNOSIS — Z86.79 STATUS POST ABLATION OF ATRIAL FIBRILLATION: ICD-10-CM

## 2025-05-09 DIAGNOSIS — Z01.818 PREOP TESTING: ICD-10-CM

## 2025-05-09 LAB
ABO GROUP (TYPE) IN BLOOD: NORMAL
ACT BLD: 236 SEC (ref 82–174)
ACT BLD: 302 SEC (ref 82–174)
ACT BLD: 307 SEC (ref 82–174)
ANTIBODY SCREEN: NORMAL
ATRIAL RATE: 70 BPM
P AXIS: 72 DEGREES
P OFFSET: 175 MS
P ONSET: 131 MS
PR INTERVAL: 160 MS
Q ONSET: 211 MS
QRS COUNT: 11 BEATS
QRS DURATION: 96 MS
QT INTERVAL: 406 MS
QTC CALCULATION(BAZETT): 438 MS
QTC FREDERICIA: 427 MS
R AXIS: 29 DEGREES
RH FACTOR (ANTIGEN D): NORMAL
T AXIS: 39 DEGREES
T OFFSET: 414 MS
VENTRICULAR RATE: 70 BPM

## 2025-05-09 PROCEDURE — 7100000009 HC PHASE TWO TIME - INITIAL BASE CHARGE: Performed by: INTERNAL MEDICINE

## 2025-05-09 PROCEDURE — 86850 RBC ANTIBODY SCREEN: CPT | Performed by: NURSE PRACTITIONER

## 2025-05-09 PROCEDURE — C1731 CATH, EP, 20 OR MORE ELEC: HCPCS | Performed by: INTERNAL MEDICINE

## 2025-05-09 PROCEDURE — 93656 COMPRE EP EVAL ABLTJ ATR FIB: CPT | Performed by: INTERNAL MEDICINE

## 2025-05-09 PROCEDURE — 93662 INTRACARDIAC ECG (ICE): CPT | Performed by: INTERNAL MEDICINE

## 2025-05-09 PROCEDURE — C1760 CLOSURE DEV, VASC: HCPCS | Performed by: INTERNAL MEDICINE

## 2025-05-09 PROCEDURE — 92960 CARDIOVERSION ELECTRIC EXT: CPT | Mod: XP | Performed by: INTERNAL MEDICINE

## 2025-05-09 PROCEDURE — 93657 TX L/R ATRIAL FIB ADDL: CPT | Performed by: INTERNAL MEDICINE

## 2025-05-09 PROCEDURE — 85347 COAGULATION TIME ACTIVATED: CPT

## 2025-05-09 PROCEDURE — C1766 INTRO/SHEATH,STRBLE,NON-PEEL: HCPCS | Performed by: INTERNAL MEDICINE

## 2025-05-09 PROCEDURE — 2500000005 HC RX 250 GENERAL PHARMACY W/O HCPCS: Performed by: ANESTHESIOLOGY

## 2025-05-09 PROCEDURE — 2500000004 HC RX 250 GENERAL PHARMACY W/ HCPCS (ALT 636 FOR OP/ED): Mod: JZ | Performed by: NURSE ANESTHETIST, CERTIFIED REGISTERED

## 2025-05-09 PROCEDURE — 2780000003 HC OR 278 NO HCPCS: Performed by: INTERNAL MEDICINE

## 2025-05-09 PROCEDURE — 7100000001 HC RECOVERY ROOM TIME - INITIAL BASE CHARGE: Performed by: INTERNAL MEDICINE

## 2025-05-09 PROCEDURE — G0269 OCCLUSIVE DEVICE IN VEIN ART: HCPCS | Performed by: INTERNAL MEDICINE

## 2025-05-09 PROCEDURE — C1732 CATH, EP, DIAG/ABL, 3D/VECT: HCPCS | Performed by: INTERNAL MEDICINE

## 2025-05-09 PROCEDURE — 7100000002 HC RECOVERY ROOM TIME - EACH INCREMENTAL 1 MINUTE: Performed by: INTERNAL MEDICINE

## 2025-05-09 PROCEDURE — 2720000007 HC OR 272 NO HCPCS: Performed by: INTERNAL MEDICINE

## 2025-05-09 PROCEDURE — 36415 COLL VENOUS BLD VENIPUNCTURE: CPT | Performed by: NURSE PRACTITIONER

## 2025-05-09 PROCEDURE — 99222 1ST HOSP IP/OBS MODERATE 55: CPT | Performed by: NURSE PRACTITIONER

## 2025-05-09 PROCEDURE — 3700000001 HC GENERAL ANESTHESIA TIME - INITIAL BASE CHARGE: Performed by: INTERNAL MEDICINE

## 2025-05-09 PROCEDURE — 3700000002 HC GENERAL ANESTHESIA TIME - EACH INCREMENTAL 1 MINUTE: Performed by: INTERNAL MEDICINE

## 2025-05-09 PROCEDURE — 93005 ELECTROCARDIOGRAM TRACING: CPT

## 2025-05-09 PROCEDURE — 2500000004 HC RX 250 GENERAL PHARMACY W/ HCPCS (ALT 636 FOR OP/ED): Mod: JZ | Performed by: NURSE PRACTITIONER

## 2025-05-09 PROCEDURE — 7100000010 HC PHASE TWO TIME - EACH INCREMENTAL 1 MINUTE: Performed by: INTERNAL MEDICINE

## 2025-05-09 PROCEDURE — 2500000004 HC RX 250 GENERAL PHARMACY W/ HCPCS (ALT 636 FOR OP/ED): Performed by: INTERNAL MEDICINE

## 2025-05-09 PROCEDURE — C1759 CATH, INTRA ECHOCARDIOGRAPHY: HCPCS | Performed by: INTERNAL MEDICINE

## 2025-05-09 RX ORDER — DROPERIDOL 2.5 MG/ML
0.62 INJECTION, SOLUTION INTRAMUSCULAR; INTRAVENOUS ONCE AS NEEDED
Status: DISCONTINUED | OUTPATIENT
Start: 2025-05-09 | End: 2025-05-09 | Stop reason: HOSPADM

## 2025-05-09 RX ORDER — PHENYLEPHRINE HCL IN 0.9% NACL 1 MG/10 ML
SYRINGE (ML) INTRAVENOUS AS NEEDED
Status: DISCONTINUED | OUTPATIENT
Start: 2025-05-09 | End: 2025-05-09

## 2025-05-09 RX ORDER — HEPARIN SODIUM 10000 [USP'U]/100ML
INJECTION, SOLUTION INTRAVENOUS CONTINUOUS PRN
Status: DISCONTINUED | OUTPATIENT
Start: 2025-05-09 | End: 2025-05-09

## 2025-05-09 RX ORDER — LIDOCAINE HYDROCHLORIDE 20 MG/ML
INJECTION, SOLUTION EPIDURAL; INFILTRATION; INTRACAUDAL; PERINEURAL AS NEEDED
Status: DISCONTINUED | OUTPATIENT
Start: 2025-05-09 | End: 2025-05-09

## 2025-05-09 RX ORDER — OXYCODONE HYDROCHLORIDE 5 MG/1
5 TABLET ORAL EVERY 4 HOURS PRN
Status: DISCONTINUED | OUTPATIENT
Start: 2025-05-09 | End: 2025-05-09 | Stop reason: HOSPADM

## 2025-05-09 RX ORDER — OMEPRAZOLE 40 MG/1
CAPSULE, DELAYED RELEASE ORAL
Qty: 60 CAPSULE | Refills: 1 | Status: SHIPPED | OUTPATIENT
Start: 2025-05-10 | End: 2025-07-09

## 2025-05-09 RX ORDER — HEPARIN SODIUM 1000 [USP'U]/ML
INJECTION, SOLUTION INTRAVENOUS; SUBCUTANEOUS AS NEEDED
Status: DISCONTINUED | OUTPATIENT
Start: 2025-05-09 | End: 2025-05-09

## 2025-05-09 RX ORDER — SODIUM CHLORIDE 9 MG/ML
100 INJECTION, SOLUTION INTRAVENOUS CONTINUOUS
Status: DISCONTINUED | OUTPATIENT
Start: 2025-05-09 | End: 2025-05-09 | Stop reason: HOSPADM

## 2025-05-09 RX ORDER — PROPOFOL 10 MG/ML
INJECTION, EMULSION INTRAVENOUS AS NEEDED
Status: DISCONTINUED | OUTPATIENT
Start: 2025-05-09 | End: 2025-05-09

## 2025-05-09 RX ORDER — PHENYLEPHRINE 10 MG/250 ML(40 MCG/ML)IN 0.9 % SOD.CHLORIDE INTRAVENOUS
CONTINUOUS PRN
Status: DISCONTINUED | OUTPATIENT
Start: 2025-05-09 | End: 2025-05-09

## 2025-05-09 RX ORDER — ACETAMINOPHEN 325 MG/1
650 TABLET ORAL EVERY 4 HOURS PRN
Status: DISCONTINUED | OUTPATIENT
Start: 2025-05-09 | End: 2025-05-09 | Stop reason: HOSPADM

## 2025-05-09 RX ORDER — ALBUTEROL SULFATE 0.83 MG/ML
2.5 SOLUTION RESPIRATORY (INHALATION) ONCE AS NEEDED
Status: DISCONTINUED | OUTPATIENT
Start: 2025-05-09 | End: 2025-05-09 | Stop reason: HOSPADM

## 2025-05-09 RX ORDER — MIDAZOLAM HYDROCHLORIDE 1 MG/ML
INJECTION INTRAMUSCULAR; INTRAVENOUS AS NEEDED
Status: DISCONTINUED | OUTPATIENT
Start: 2025-05-09 | End: 2025-05-09

## 2025-05-09 RX ORDER — PROTAMINE SULFATE 10 MG/ML
INJECTION, SOLUTION INTRAVENOUS AS NEEDED
Status: DISCONTINUED | OUTPATIENT
Start: 2025-05-09 | End: 2025-05-09

## 2025-05-09 RX ORDER — ONDANSETRON HYDROCHLORIDE 2 MG/ML
INJECTION, SOLUTION INTRAVENOUS AS NEEDED
Status: DISCONTINUED | OUTPATIENT
Start: 2025-05-09 | End: 2025-05-09

## 2025-05-09 RX ORDER — ONDANSETRON HYDROCHLORIDE 2 MG/ML
4 INJECTION, SOLUTION INTRAVENOUS EVERY 8 HOURS PRN
Status: DISCONTINUED | OUTPATIENT
Start: 2025-05-09 | End: 2025-05-09 | Stop reason: HOSPADM

## 2025-05-09 RX ORDER — ROCURONIUM BROMIDE 10 MG/ML
INJECTION, SOLUTION INTRAVENOUS AS NEEDED
Status: DISCONTINUED | OUTPATIENT
Start: 2025-05-09 | End: 2025-05-09

## 2025-05-09 RX ORDER — OMEPRAZOLE 40 MG/1
40 CAPSULE, DELAYED RELEASE ORAL
Status: ON HOLD | COMMUNITY
End: 2025-05-09

## 2025-05-09 RX ORDER — DIPHENHYDRAMINE HYDROCHLORIDE 50 MG/ML
25 INJECTION, SOLUTION INTRAMUSCULAR; INTRAVENOUS ONCE AS NEEDED
Status: DISCONTINUED | OUTPATIENT
Start: 2025-05-09 | End: 2025-05-09 | Stop reason: HOSPADM

## 2025-05-09 RX ORDER — ONDANSETRON 4 MG/1
4 TABLET, FILM COATED ORAL EVERY 8 HOURS PRN
Status: DISCONTINUED | OUTPATIENT
Start: 2025-05-09 | End: 2025-05-09 | Stop reason: HOSPADM

## 2025-05-09 RX ORDER — ONDANSETRON HYDROCHLORIDE 2 MG/ML
4 INJECTION, SOLUTION INTRAVENOUS ONCE AS NEEDED
Status: DISCONTINUED | OUTPATIENT
Start: 2025-05-09 | End: 2025-05-09 | Stop reason: HOSPADM

## 2025-05-09 RX ORDER — FENTANYL CITRATE 50 UG/ML
INJECTION, SOLUTION INTRAMUSCULAR; INTRAVENOUS AS NEEDED
Status: DISCONTINUED | OUTPATIENT
Start: 2025-05-09 | End: 2025-05-09

## 2025-05-09 RX ORDER — BUPIVACAINE HYDROCHLORIDE 2.5 MG/ML
INJECTION, SOLUTION EPIDURAL; INFILTRATION; INTRACAUDAL; PERINEURAL AS NEEDED
Status: DISCONTINUED | OUTPATIENT
Start: 2025-05-09 | End: 2025-05-09 | Stop reason: HOSPADM

## 2025-05-09 RX ADMIN — FENTANYL CITRATE 100 MCG: 50 INJECTION, SOLUTION INTRAMUSCULAR; INTRAVENOUS at 09:25

## 2025-05-09 RX ADMIN — LIDOCAINE HYDROCHLORIDE 100 MG: 20 INJECTION, SOLUTION EPIDURAL; INFILTRATION; INTRACAUDAL; PERINEURAL at 07:46

## 2025-05-09 RX ADMIN — PHENYLEPHRINE-NACL IV SOLUTION 10 MG/250ML-0.9% 0.2 MCG/KG/MIN: 10-0.9/25 SOLUTION at 08:32

## 2025-05-09 RX ADMIN — ONDANSETRON 4 MG: 2 INJECTION INTRAMUSCULAR; INTRAVENOUS at 09:25

## 2025-05-09 RX ADMIN — Medication 100 MCG: at 08:04

## 2025-05-09 RX ADMIN — SODIUM CHLORIDE 100 ML/HR: 9 INJECTION, SOLUTION INTRAVENOUS at 07:08

## 2025-05-09 RX ADMIN — Medication 6 L/MIN: at 09:56

## 2025-05-09 RX ADMIN — DEXAMETHASONE SODIUM PHOSPHATE 10 MG: 4 INJECTION, SOLUTION INTRA-ARTICULAR; INTRALESIONAL; INTRAMUSCULAR; INTRAVENOUS; SOFT TISSUE at 08:21

## 2025-05-09 RX ADMIN — PROPOFOL 200 MG: 10 INJECTION, EMULSION INTRAVENOUS at 07:46

## 2025-05-09 RX ADMIN — HEPARIN SODIUM 5000 UNITS: 1000 INJECTION INTRAVENOUS; SUBCUTANEOUS at 08:39

## 2025-05-09 RX ADMIN — Medication 100 MCG: at 07:55

## 2025-05-09 RX ADMIN — HEPARIN SODIUM 14000 UNITS: 1000 INJECTION INTRAVENOUS; SUBCUTANEOUS at 08:27

## 2025-05-09 RX ADMIN — Medication 100 MCG: at 08:34

## 2025-05-09 RX ADMIN — SODIUM CHLORIDE: 9 INJECTION, SOLUTION INTRAVENOUS at 07:14

## 2025-05-09 RX ADMIN — MIDAZOLAM HYDROCHLORIDE 2 MG: 1 INJECTION, SOLUTION INTRAMUSCULAR; INTRAVENOUS at 07:38

## 2025-05-09 RX ADMIN — HEPARIN SODIUM AND DEXTROSE 25.86 UNITS/KG/HR: 10000; 5 INJECTION INTRAVENOUS at 08:27

## 2025-05-09 RX ADMIN — ROCURONIUM BROMIDE 60 MG: 10 INJECTION, SOLUTION INTRAVENOUS at 07:46

## 2025-05-09 RX ADMIN — SUGAMMADEX 200 MG: 100 INJECTION, SOLUTION INTRAVENOUS at 09:39

## 2025-05-09 RX ADMIN — PROTAMINE SULFATE 40 MG: 10 INJECTION, SOLUTION INTRAVENOUS at 09:30

## 2025-05-09 ASSESSMENT — ENCOUNTER SYMPTOMS
SHORTNESS OF BREATH: 1
APNEA: 1
PSYCHIATRIC NEGATIVE: 1
HEMATOLOGIC/LYMPHATIC NEGATIVE: 1
EYES NEGATIVE: 1
PALPITATIONS: 1
NEUROLOGICAL NEGATIVE: 1
ALLERGIC/IMMUNOLOGIC NEGATIVE: 1
GASTROINTESTINAL NEGATIVE: 1
MUSCULOSKELETAL NEGATIVE: 1
ENDOCRINE NEGATIVE: 1
FATIGUE: 1

## 2025-05-09 ASSESSMENT — COLUMBIA-SUICIDE SEVERITY RATING SCALE - C-SSRS
1. IN THE PAST MONTH, HAVE YOU WISHED YOU WERE DEAD OR WISHED YOU COULD GO TO SLEEP AND NOT WAKE UP?: NO
6. HAVE YOU EVER DONE ANYTHING, STARTED TO DO ANYTHING, OR PREPARED TO DO ANYTHING TO END YOUR LIFE?: NO
2. HAVE YOU ACTUALLY HAD ANY THOUGHTS OF KILLING YOURSELF?: NO

## 2025-05-09 ASSESSMENT — PAIN SCALES - GENERAL

## 2025-05-09 NOTE — ANESTHESIA PROCEDURE NOTES
Arterial Line:    Date/Time: 5/9/2025 7:48 AM    Staffing  Performed: CRNA   Authorized by: Juan Poe MD    Performed by: ONEL Peters-CRNA    An arterial line was placed. Procedure performed using surface landmarks.in the OR for the following indication(s): continuous blood pressure monitoring.    A 20 gauge (size), 1 and 3/4 inch (length), Angiocath (type) catheter was placed into the Right radial artery, secured by Tegaderm,   Seldinger technique not used.  Events:  patient tolerated procedure well with no complications.

## 2025-05-09 NOTE — ANESTHESIA PROCEDURE NOTES
Airway  Date/Time: 5/9/2025 7:48 AM  Reason: elective    Airway not difficult    Staffing  Performed: attending   Authorized by: Juan Poe MD    Performed by: BLAYNE Peters  Patient location during procedure: OR    Patient Condition  Indications for airway management: anesthesia and airway protection  Patient position: sniffing  MILS maintained throughout  Sedation level: deep     Final Airway Details   Preoxygenated: yes  Final airway type: endotracheal airway  Successful airway: ETT  Cuffed: yes   Successful intubation technique: direct laryngoscopy  Endotracheal tube insertion site: oral  Blade: Marcos  Blade size: #4  ETT size (mm): 7.5  Cormack-Lehane Classification: grade III - view of epiglottis only  Placement verified by: chest auscultation and capnometry   Measured from: lips  Number of attempts at approach: 1  Number of other approaches attempted: 0

## 2025-05-09 NOTE — H&P
History Of Present Illness  Camden Lance is a 64 y.o. male with PMHx significant for asthma, LOI, pHTN, DDD, cervical stenosis, Afib on Eliquis and Flecainide presenting with persistent Afib, here for RFA with Dr. Duque.    Last dose of Eliquis yesterday PM.       Past Medical History:  Medical History[1]     Past Surgical History:  Surgical History[2]       Social History:  Social History[3]    Family History:  Family History[4]     Allergies:  RX Allergies[5]     Home Medications:  Current Outpatient Medications   Medication Instructions    albuterol 90 mcg/actuation inhaler Inhale.    albuterol 2.5 mg    apixaban (ELIQUIS) 5 mg, oral, 2 times daily    flecainide (TAMBOCOR) 100 mg, oral, 2 times daily    fluticasone furoate-vilanteroL (Breo Ellipta) 200-25 mcg/dose inhaler Inhale.    gabapentin (Neurontin) 300 mg capsule Take 1 cap at night x3 days, then 1 cap 2x/day    omeprazole (PRILOSEC) 40 mg, Daily before breakfast    Sutab 1.479-0.188- 0.225 gram tablet tablet use as directed       Inpatient Medications:  Scheduled Medications[6]  PRN Medications[7]  Continuous Medications[8]      Review of Systems   Constitutional:  Positive for fatigue.   HENT: Negative.     Eyes: Negative.    Respiratory:  Positive for apnea (hx LOI not on PAP) and shortness of breath (CARMEN).    Cardiovascular:  Positive for palpitations.   Gastrointestinal: Negative.    Endocrine: Negative.    Genitourinary: Negative.    Musculoskeletal: Negative.    Skin: Negative.    Allergic/Immunologic: Negative.    Neurological: Negative.    Hematological: Negative.    Psychiatric/Behavioral: Negative.            Physical Exam  General:  Patient is awake, alert, and oriented.  Patient is in no acute distress.  HEENT:  Pupils equal and reactive.  Normocephalic.  Moist mucosa.    Neck:  No JVD.   Cardiovascular: IRR. No murmurs/rubs/gallops. Radial pulses 2+.   Pulmonary:  Clear to auscultation bilaterally.  Abdomen:  Soft. Non-tender.    "Non-distended.  Positive bowel sounds.  Lower Extremities:  Pedal pulses 2+ No LE edema.  Neurologic:  Cranial nerves II-XII grossly intact.   No focal deficit.   Skin: Skin warm and dry, no lesions. Normal skin turgor.   Psychiatric: Normal affect.     Sedation Plan    ASA 3     Mallampati class: III.    Risks, benefits, and alternatives discussed with patient.         NPO since 0000    Last Recorded Vitals  Blood pressure 137/82, pulse 81, temperature 35.8 °C (96.4 °F), resp. rate 16, SpO2 96%.         Vitals from the Past 24 Hours  Heart Rate:  [81]   Temp:  [35.8 °C (96.4 °F)]   Resp:  [16]   BP: (137)/(82)   SpO2:  [96 %]          Relevant Results    Labs    POCT Glucose:      POCT Urine Pregnancy:       CBC:   Recent Labs     04/19/25  1028 02/11/25  1357   WBC 8.1 12.6*   HGB 16.6 17.8*   HCT 49.1 55.2*    279   MCV 88.9 90     BMP/CMP:   Recent Labs     04/19/25  1028 02/11/25  1357    134*   K 4.2 4.1    104   BUN 15 15   CREATININE 0.85 0.84   CO2 26 25   CALCIUM 9.3 10.0   PROT  --  7.2   BILITOT  --  0.7   ALKPHOS  --  72   ALT  --  21   AST  --  15   GLUCOSE 72 107*      Magnesium:   Recent Labs     02/11/25  1357   MG 1.97     Lipid Panel: No results for input(s): \"CHOL\", \"HDL\", \"CHHDL\", \"LDL\", \"VLDL\", \"TRIG\", \"NHDL\" in the last 41559 hours.  Cardiac       No lab exists for component: \"CK\", \"CKMBP\"   Hemoglobin A1C:   Recent Labs     04/17/24  0731   HGBA1C 5.6     TSH/ Free T4: No results for input(s): \"TSH\", \"FREET4\" in the last 06747 hours.  Iron: No results for input(s): \"FERRITIN\", \"TIBC\", \"IRONSAT\", \"BNP\" in the last 89436 hours.  Coag:     ABO: No results found for: \"ABO\"    Past Cardiology Tests (Last 3 Years):    EKG:  Recent Labs     04/07/25  1000 02/24/25  0802 02/11/25  1354   ATRRATE 73 86 85   VENTRATE 73 86 98   PRINT 186 150 144   QRSDUR 102 90 96   QTCFRED 435 394 410   QTCCALCB 449 418 445     Encounter Date: 04/07/25   ECG 12 lead (Clinic Performed)   Result Value " "   Ventricular Rate 73    Atrial Rate 73    TX Interval 186    QRS Duration 102    QT Interval 408    QTC Calculation(Bazett) 449    P Axis 49    R Axis 60    T Axis 50    QRS Count 12    Q Onset 208    P Onset 115    P Offset 173    T Offset 412    QTC Fredericia 435    Narrative    Sinus rhythm with marked sinus arrhythmia  Otherwise normal ECG  When compared with ECG of 24-FEB-2025 08:13,  Premature supraventricular complexes are no longer Present  T wave inversion no longer evident in Inferior leads  Confirmed by Suresh Coronel ([id number]) on 4/14/2025 1:07:36 PM     Echo:  Echocardiogram:   ECHOCARDIOGRAM     Narrative  Ordered by an unspecified provider.    Ejection Fractions:  No results found for: \"EF\"  Cath:  Coronary Angiography: No results found for this or any previous visit from the past 1800 days.    Right Heart Cath: No results found for this or any previous visit from the past 1800 days.    Stress Test:  Nuclear:No results found for this or any previous visit from the past 1800 days.    Metabolic Stress: No results found for this or any previous visit from the past 1800 days.    Cardiac Imaging:  Cardiac Scoring:   CT angio coronary art with heartflow if score >30% 01/10/2025    Narrative  Interpreted By:  Jerry Griffith,  STUDY:  CT ANGIO CORONARY ART WITH HEARTFLOW IF SCORE >30%;  1/10/2025 11:47  am    INDICATION:  Signs/Symptoms:Chest pain.    ,R00.2 Palpitations,I25.9 Chronic ischemic heart disease, unspecified    COMPARISON:  None.    ACCESSION NUMBER(S):  [id number]    ORDERING CLINICIAN:  OCHOA LISA    TECHNIQUE:  Using multi-detector CT technology,  axial, sequential imaging with  prospective gating was performed of the chest following the  intravenous administration of 95 ML Omnipaque 350.  In addition,  CT-FFR analysis was also performed.    The patient was premedicated with 0.8 mg sublingual nitroglycerin for  heart rate control and coronary dilation, respectively.    For optimization of " anatomic evaluation, multiplanar reconstruction,  maximum intensity projections, and advanced 3-D off-line  postprocessing were performed on a dedicated stand-alone workstation  under the direct supervision of the interpreting physician.    FINDINGS:  The score and distribution of calcium in the coronary arteries is as  follows:    LM 0,  LAD 0,  LCx 0,  RCA 0,    Total 0    POTENTIAL STUDY LIMITATIONS:  None.    CORONARY ARTERIES:    CORONARY ANATOMY:  There is normal origin of the coronary arteries.    LEFT MAIN CORONARY ARTERY:  The left main is normal sized vessel that  bifurcates into the LAD  and circumflex. There is no significant atherosclerotic change or  stenotic disease.    LEFT ANTERIOR DESCENDING ARTERY:  The LAD is a normal size vessel that  wraps around the apex.  It gives rise to  1 acute diagonal branches.  There is no significant atherosclerotic change or stenotic disease.    LEFT CIRCUMFLEX ARTERY:  The LCX is a normal size vessel, which is  non-dominant.  It gives rise to  1 obtuse marginal branches.  There is no significant atherosclerotic change or stenotic disease.    RAMUS INTERMEDIUS:  The ramus is a normal sized vessel.    There is no significant atherosclerotic change or stenotic disease.    RIGHT CORONARY ARTERY:  The RCA is a normal size vessel, which is  dominant .  It gives rise to a  conus branch,  annalisa branch, and  1 acute  marginal branches.  In its distal segment it bifurcates into the PDA  and PV branch. There is no significant atherosclerotic change or  stenotic disease.    CARDIAC CHAMBERS:  The cardiac chambers demonstrate normal atrioventricular and  ventriculoarterial concordance, and systemic and pulmonary venous  return.    LEFT ATRIUM:  Normal size    RIGHT ATRIUM:  Normal size (  INTERATRIAL SEPTUM:  Intact.    LEFT VENTRICLE:  Normal size (    RIGHT VENTRICLE:  Normal size (  AORTIC VALVE:  The aortic valve is  trileaflet in morphology.  No calcifications.    MITRAL  "VALVE:  No thickening/calcification.    THORACIC AORTA:  The visualized thoracic aorta is normal in course, caliber, and  contour. There is no acute aortic pathology, such as dissection,  intramural hematoma, or contained rupture. The aortic arch is not  included on this examination.    PERICARDIUM:  There is no pericardial effusion of thickening.    CHEST:  The chest wall is normal.  No significant lymphadenopathy or mass is seen in limited images of  the mediastinum. Limited imaging through the lungs reveals no gross  abnormalities. No pleural effusion or pneumothorax.    UPPER ABDOMEN:  Low-attenuation segment 4 hepatic cyst.    Impression  1.  Normal coronary anatomy without evidence of atherosclerotic  changes or stenotic disease.      MACRO:  None    Signed by: Amelia Griffith 1/10/2025 1:08 PM  Dictation workstation:   [id number]    Cardiac MRI:   MR cardiac morphology and function w and wo IV contrast 2022    [facility]    CMR Report    MRN:                    [id number]  Name:          MARIAA ZARATE  :                  1960  Scan Date:   2022 14:26:37    Electronically signed by Kenneth Flores 2022-Sep-02 10:40:10    GENERAL INFORMATION  =====================================================================  =====================================    HEIGHT: 74.80 in    (190.00 cm)  WEIGHT: 242.51 lbs    (110.00 kgs)  BSA: 2.38 m\S\2  BASELINE HR: 50 BPM  SCAN LOCATION: [facility]  REFERRING PHYSICIAN: AMELIA CORBIN  ATTENDING PHYSICIAN: AMELIA CORBIN  TECHNOLOGIST: Laura Schaefer  FELLOWS: Jamar Houston  ACCESSION NUMBER: [id number]  CPT CODES: 67896, [ , ]67888  ICD10 CODES: R06.02, [ , ]R93.1  PATIENT HISTORY: Device: No, HT: 190.5 cm, WT: 110.22 kg\E\n62 yo M  with PAH, and poor quality outside echo stating \"RV cavity moderately  enlarged\" presenting for cMRI for further cardiac workup of " PAH.    SUMMARY  =====================================================================  =====================================    CMR performed on 1.5T scanner  1. Normal LV size (EDVi  93ml/m2) with low-normal systolic function  (LVEF 53%).  Focal hypokinesis in the  basal inferolateral segment.  2. Quantitatively normal RV size (EDVi 91ml/m2) with normal systolic  function (RVEF 54%).  3. Multi-focal mid-myocardial late gadolinium enhancement involving  the basal lateral segment which likely  represent prior myocarditis; however, sarcoidosis remains in the  differential diagnosis.    LEFT VENTRICLE: 1. Normal LV size (EDVi  93ml/m2) with low-normal  systolic function (LVEF 53%).  2. Focal hypokinesis in the basal inferolateral segment.  3. Normal LV wall thickness with decreased indexed LV mass.  4. Normal ECV 28%.  5. Normal native T2 values (<50 ms).  6. Following administration of gadolinium, in the early phase, there  is no evidence of LV thrombus or MVO.  7. In the late phase, there is multi-focal mid-myocardial enhancement  involving the basal lateral segment  which likely represent prior myocarditis; however, sarcoidosis  remains in the differential diagnosis.    VIABILITY: LV scar size is 1 %.    RIGHT VENTRICLE: Visually, the RV appears mildly to moderately  dilated, however, quantitatively normal RV size (EDVi  91ml/m2) with normal systolic function (RVEF 54%).    LV/RV SEPTUM: The LV/RV septum is normal.    LA/RA SEPTUM: The LA/RA septum is normal.    LEFT ATRIUM: The left atrium is normal.    RIGHT ATRIUM: The right atrium is normal.    PERICARDIUM: The pericardium is normal.    PLEURAL EFFUSION: There is no pleural effusion.    AORTIC VALVE: Peak aortic valve velocity 143 cm/sec. Aortic  regurgitant volume 1 ml. Aortic regurgitant fraction 1 %.    MITRAL VALVE: The mitral valve is normal.    TRICUSPID VALVE: The tricuspid valve is normal. ???    PULMONIC VALVE: Normal Pulmonary valve. Pulmonic  regurgitant volume 2  ml. Peak pulmonic valve velocity -150 cm/sec.  Pulmonic regurgitant fraction 2 %.    AORTIC ROOT: The aortic root is normal.    OTHER FINDINGS: Redemonstration of multiple liver cysts, appear  relatively stable compared with CT (3/29/2018)      CORE EXAM  =====================================================================  =====================================    MEASUREMENTS  ---------------------------------------------------------------------  -------------------  VOLUMETRIC ANALYSIS  ----------------------------------------------  .---------------------------------------------------------.  .      .          . LV   . Reference  . RV   . Reference  .  +------+----------+------+------------+------+------------+  . EDV  . ml       .  222 .  (109-191) .  216 .  (105-205) .  .      . ml/m\S\2    .   93 .  (60-95)   .   91 .  ()  .  . ESV  . ml       .  103 .  (27-72)   .   98 .  (20-80)   .  .      . ml/m\S\2    .   43 .  (14-36)   .   41 .  (11-40)   .  . CO   . L/min    . 6.28 .            . 6.23 .            .  .      . L/min/m\S\2 . 2.64 .            . 2.62 .            .  . MASS . g        .  109 .  (107-183) .      .            .  .      . g/m\S\2     .   46 .  (57-90)   .      .            .  . SV   . ml       .  119 .  ()  .  118 .  ()  .  .      . ml/m\S\2    .   50 .  (40-64)   .   49 .  (37-69)   .  . EF   . %        .   53 .  (58-76)   .   54 .  (55-81)   .  '------+----------+------+------------+------+------------'    CARDIAC OUTPUT HR:  53 BPM  LV DIMENSIONS  ----------------------------------------------  WALL THICKNESS - ANTEROSEPTAL:  0.73 cm  WALL THICKNESS - INFEROLATERAL:  0.75 cm  LV CONNOR:  6.7 cm  LV ESD:  5.21 cm    LA DIMENSIONS (LV SYSTOLE)  ----------------------------------------------  DIAMETER:  4.75 cm  AREA - 4 CHAMBER:  24.85 cm\S\2  LENGTH - 4 CHAMBER:  6.2 cm    RA DIMENSIONS (RV  SYSTOLE)  ----------------------------------------------  DIAMETER:  4.98 cm  AREA - 4 CHAMBER:  24.55 cm\S\2  LENGTH - 4 CHAMBER:  5.78 cm    AORTIC ROOT DIMENSIONS  ----------------------------------------------  ANNULUS:  3.01 cm  SINUS OF VALSALVA:  3.58 cm  SINOTUBULAR JUNCTION:  3.11 cm    EXTRACELLULAR VOLUME MEASUREMENT  ----------------------------------------------  PRE-CONTRAST T1 MYOCARDIUM:  1045 msec  PRE-CONTRAST T1 LV CAVITY:  1599 msec  POST-CONTRAST T1 MYOCARDIUM:  350 msec  POST-CONTRAST T1 LV CAVITY:  262 msec  HEMATOCRIT:  52.8 %  HEMATOCRIT DATE:  2022-03-13 00:00:00  ECV:  28 %      17 SEGMENT  ---------------------------------------------------------------------  -------------------  .---------------------------------------------------------------------  ---------------------------.  . Segments           . Wall Motion  . Hyperenhancement . Stress  Perfusion . Interpretation       .  +--------------------+--------------+------------------+--------------  ----+----------------------+  . Base Anterior      . Normal/Hyper . None             .  . Normal               .  . Base Anteroseptal  . Normal/Hyper . None             .  . Normal               .  . Base Inferoseptal  . Normal/Hyper . None             .  . Normal               .  . Base Inferior      . Normal/Hyper . None             .  . Normal               .  . Base Inferolateral . Normal/Hyper . 1-25%            .  . Normal               .  . Base Anterolateral . Normal/Hyper . None             .  . Normal               .  . Mid Anterior       . Normal/Hyper . None             .  . Normal               .  . Mid Anteroseptal   . Normal/Hyper . None             .  . Normal               .  . Mid Inferoseptal   . Normal/Hyper . None             .  . Normal               .  . Mid Inferior       . Normal/Hyper . None             .  . Normal               .  . Mid Inferolateral  . Normal/Hyper . None             .  . Normal               .  .  Mid Anterolateral  . Normal/Hyper . None             .  . Normal               .  . Apical Anterior    . Normal/Hyper . None             .  . Normal               .  . Apical Septal      . Normal/Hyper . None             .  . Normal               .  . Apical Inferior    . Normal/Hyper . None             .  . Normal               .  . Apical Lateral     . Normal/Hyper . None             .  . Normal               .  . [place]               . Normal/Hyper . None             .  . Normal               .  +--------------------+--------------+------------------+--------------  ----+----------------------+  . RV Segments        . Wall Motion  . Hyperenhancement . Stress  Perfusion . Interpretation       .  +--------------------+--------------+------------------+--------------  ----+----------------------+  . RV Basal Anterior  . Normal/Hyper .                  .  . Normal               .  . RV Basal Inferior  . Normal/Hyper .                  .  . Normal               .  . RV Mid             . Normal/Hyper .                  .  . Abnormal Wall Motion .  . RV Apical          . Normal/Hyper .                  .  . Abnormal Wall Motion .  '--------------------+--------------+------------------+--------------  ----+----------------------'    FINDINGS  ----------------------------------------------  LV SCAR SIZE (17 SEGMENT):  1 %      SCAN INFO  =====================================================================  =====================================    GENERAL  ---------------------------------------------------------------------  -------------------  SCANNER  ----------------------------------------------  :  [employer]  MODEL:  MAGNETOM Aera  PULSE SEQUENCES:  SSFP cine, 2D LGE segmented, 2D LGE  single-shot, Black-blood LGE (or Grey-blood),  Pre-contrast T1 mapping, Post-contrast T1 mapping, T2  mapping, First-pass perfusion without stress,  Phase contrast imaging, HASTE morphology    CONTRAST  AGENT  ----------------------------------------------  TYPE:  Dotarem  LOT NUMBER:  [id number]  EXPIRATION DATE:  2027-04-01 00:00:00  GD CONCENTRATION:  0.5 M  VOLUME ADMINISTERED:  40 ml  DOSAGE:  0.18 mmol/kg    SEDATION  ----------------------------------------------  SEDATION USED?:  No    SETUP  ----------------------------------------------  SCAN TYPE:  Clinical  PATIENT TYPE:  Outpatient  INCOMPLETE SCAN:  No  REASON(S) FOR SCAN:  ARVD (known/suspect)      Report generated by [employer], a product of [employer]         Assessment/Plan  Assessment/Plan   Assessment & Plan  Palpitations    PAF (paroxysmal atrial fibrillation) (Multi)    Preop testing        -Afib ablation with Dr. Shell today 5/9/25  -Resume Eliquis tonight post ablation pending hemostasis   -PPI Prophylaxis: Increase home Omeprazole to 40 mg BID x 30 days post ablation         NP discussed with Dr. Shell regarding plan of care/ discharge plan      I spent 30 minutes in the professional and overall care of this patient.      Brandi Vasquez, APRN-CNP         [1]   Past Medical History:  Diagnosis Date    Asthma     Atrial fibrillation (Multi)     Hypertension     Migraines     Pulmonary HTN (Multi)     RVSP 51 mmHg on echo 5/17/2022    Sleep apnea    [2]   Past Surgical History:  Procedure Laterality Date    APPENDECTOMY      CHOLECYSTECTOMY      COLONOSCOPY      HERNIA REPAIR     [3]   Social History  Tobacco Use    Smoking status: Never     Passive exposure: Never    Smokeless tobacco: Never   Vaping Use    Vaping status: Never Used   Substance Use Topics    Alcohol use: Not Currently    Drug use: Not Currently   [4] No family history on file.  [5]   Allergies  Allergen Reactions    Amoxicillin GI Upset and Nausea And Vomiting    Penicillins GI Upset and Nausea And Vomiting   [6]   Scheduled medications   Medication Dose Route Frequency   [7]   PRN medications   Medication   [8]   Continuous Medications   Medication Dose  Last Rate    sodium chloride 0.9%  100 mL/hr 100 mL/hr (05/09/25 0719)

## 2025-05-09 NOTE — DISCHARGE INSTRUCTIONS
INSTRUCTIONS AFTER ABLATION PROCEDURE:    * You will need to continue blood thinner (warfarin, Pradaxa, Xarelto or Eliquis) until instructed otherwise. It is important not to interrupt blood thinner for any reason (other than an emergency) during the first 30 days after ablation. RESTART ELIQUIS TONIGHT.     * INCREASE OMEPRAZOLE TO 40 MG TWICE DAILY (BEFORE BREAKFAST AND BEFORE DINNER) for 4 weeks to protect the esophagus as it can become irritated with ablation. It is very important that you take this medication. After 4 weeks, go back to previous dose.     * All other medications will generally remain the same unless you are told otherwise.  Resume taking your home medications today (including blood thinner) as listed on the discharge instructions.    * In the first week post-ablation you should take it easy. No heavy lifting or heavy exercise, no treadmill. You can use the stairs if needed but go slowly and minimize the number of times up and down.    * Some minor bruising is common at each groin access site with minor soreness as if you had banged the area. Bruising may occasionally be seen to extend down the leg. This is normal as is an occasional small quarter sized bump in the area. If larger swelling or more significant pain occurs at the area, please contact the office or go the nearest Emergency Room.    * You may have some minor chest pain for the next week or so. The pain will often worsen with a deep breath and be better when leaning forward. This is pericardial chest pain from the ablation and is generally not of concern. It should resolve within a week although it might increase for a day or so after the ablation.    * If you develop unexplained a fever exceeding 100 degrees anytime within the first 3 weeks post-ablation, you need to contact the office. Low grade fevers of around 99 degrees are common in the first day or so post-ablation.    * Atrial fibrillation (AFib) can recur in all patients  who undergo this ablation for up to 4-8 weeks post-ablation. The ablation itself can cause inflammation (pericarditis) in the atria and this can cause AFib. Some patients will actually experience an increased amount of atrial arrhythmia early after ablation. Approximately 1/3 of patients will have this early recurrence of AFib. Medications should be continued and your heart rate controlled. Nothing else needs be done initially except waiting as in many cases these episodes of AFib will prove self limited.    * Continue to follow up with your primary care physician, primary cardiologist, and any other specialists you normally see.    * No driving for at least 24 hours post procedure (IF you were driving prior to procedure)    *Diet: Heart healthy    Call Provider If:  Breathing faster than normal.     Fever of 100.4 F (38 C) or higher.     Chills.     Any new concerning symptoms.     Passing out.     Patient Instructions, Next 24 hours:  DO NOT drive a car, operate machinery or power tools.  It is recommended that a responsible adult be with you for the first 24 hours.     DO NOT drink any alcoholic drinks or take any non-prescriptive medications that contain alcohol for the first 24 hours.     DO NOT make any important decisions for the first 24 hours.    Activity:  You are advised to go directly home from the hospital.     DO NOT lift anything heavier than 10 pounds for one week, this allows for proper healing of the groin.     No excessive exercise or treadmill use for one week. You may walk and do stairs, slowly.     No sexual activities for 24 hours after you arrive home.    Wound Care:  If slight bleeding should occur at groin site, lie down and have someone apply firm pressure just above the puncture site for 5 minutes.  If it continues or is profuse, call 911. Always notify your doctor if bleeding occurs.     Keep site clean and dry. Let air dry or you may use a simple bandaid.     Gently cleanse the puncture  site in your groin with soap and water only.     You may experience some tenderness, bruising or minimal inflammation.  If you have any concerns, you may contact the EP Lab or if any of these symptoms become excessive, contact your electrophysiologist or go to the emergency room.     No tub baths, soaking, hot tubs, or swimming for one week.     May shower the next day after your procedure.    Other Instructions:  If you have any questions about the effects of the sedative drugs or groin care, please call the physician who performed your procedure.      FOLLOW UP:   In 1 month with Farideh Phipps CNP. An appointment has been requested- please call the office if you do not hear anything in 3 business days

## 2025-05-10 ENCOUNTER — HOSPITAL ENCOUNTER (EMERGENCY)
Facility: HOSPITAL | Age: 65
Discharge: HOME | End: 2025-05-11
Attending: EMERGENCY MEDICINE
Payer: COMMERCIAL

## 2025-05-10 ENCOUNTER — APPOINTMENT (OUTPATIENT)
Dept: CARDIOLOGY | Facility: HOSPITAL | Age: 65
End: 2025-05-10
Payer: COMMERCIAL

## 2025-05-10 ENCOUNTER — APPOINTMENT (OUTPATIENT)
Dept: RADIOLOGY | Facility: HOSPITAL | Age: 65
End: 2025-05-10
Payer: COMMERCIAL

## 2025-05-10 DIAGNOSIS — J18.9 PNEUMONIA OF LEFT LOWER LOBE DUE TO INFECTIOUS ORGANISM: Primary | ICD-10-CM

## 2025-05-10 LAB
ALBUMIN SERPL BCP-MCNC: 3.7 G/DL (ref 3.4–5)
ALP SERPL-CCNC: 58 U/L (ref 33–136)
ALT SERPL W P-5'-P-CCNC: 13 U/L (ref 10–52)
ANION GAP SERPL CALC-SCNC: 10 MMOL/L (ref 10–20)
AST SERPL W P-5'-P-CCNC: 15 U/L (ref 9–39)
BASOPHILS # BLD AUTO: 0.04 X10*3/UL (ref 0–0.1)
BASOPHILS NFR BLD AUTO: 0.4 %
BILIRUB SERPL-MCNC: 0.5 MG/DL (ref 0–1.2)
BNP SERPL-MCNC: 101 PG/ML (ref 0–99)
BUN SERPL-MCNC: 12 MG/DL (ref 6–23)
CALCIUM SERPL-MCNC: 8.8 MG/DL (ref 8.6–10.3)
CARDIAC TROPONIN I PNL SERPL HS: 585 NG/L (ref 0–20)
CHLORIDE SERPL-SCNC: 106 MMOL/L (ref 98–107)
CO2 SERPL-SCNC: 24 MMOL/L (ref 21–32)
CREAT SERPL-MCNC: 0.81 MG/DL (ref 0.5–1.3)
EGFRCR SERPLBLD CKD-EPI 2021: >90 ML/MIN/1.73M*2
EOSINOPHIL # BLD AUTO: 0.3 X10*3/UL (ref 0–0.7)
EOSINOPHIL NFR BLD AUTO: 3.3 %
ERYTHROCYTE [DISTWIDTH] IN BLOOD BY AUTOMATED COUNT: 13.2 % (ref 11.5–14.5)
FLUAV RNA RESP QL NAA+PROBE: NOT DETECTED
FLUBV RNA RESP QL NAA+PROBE: NOT DETECTED
GLUCOSE SERPL-MCNC: 117 MG/DL (ref 74–99)
HCT VFR BLD AUTO: 44.8 % (ref 41–52)
HGB BLD-MCNC: 14.6 G/DL (ref 13.5–17.5)
IMM GRANULOCYTES # BLD AUTO: 0.09 X10*3/UL (ref 0–0.7)
IMM GRANULOCYTES NFR BLD AUTO: 1 % (ref 0–0.9)
LACTATE SERPL-SCNC: 0.8 MMOL/L (ref 0.4–2)
LYMPHOCYTES # BLD AUTO: 1.07 X10*3/UL (ref 1.2–4.8)
LYMPHOCYTES NFR BLD AUTO: 11.7 %
MCH RBC QN AUTO: 28.7 PG (ref 26–34)
MCHC RBC AUTO-ENTMCNC: 32.6 G/DL (ref 32–36)
MCV RBC AUTO: 88 FL (ref 80–100)
MONOCYTES # BLD AUTO: 1.07 X10*3/UL (ref 0.1–1)
MONOCYTES NFR BLD AUTO: 11.7 %
NEUTROPHILS # BLD AUTO: 6.54 X10*3/UL (ref 1.2–7.7)
NEUTROPHILS NFR BLD AUTO: 71.9 %
NRBC BLD-RTO: 0 /100 WBCS (ref 0–0)
PLATELET # BLD AUTO: 203 X10*3/UL (ref 150–450)
POTASSIUM SERPL-SCNC: 3.4 MMOL/L (ref 3.5–5.3)
PROT SERPL-MCNC: 5.9 G/DL (ref 6.4–8.2)
RBC # BLD AUTO: 5.08 X10*6/UL (ref 4.5–5.9)
SODIUM SERPL-SCNC: 137 MMOL/L (ref 136–145)
TSH SERPL-ACNC: 1.02 MIU/L (ref 0.44–3.98)
WBC # BLD AUTO: 9.1 X10*3/UL (ref 4.4–11.3)

## 2025-05-10 PROCEDURE — 83605 ASSAY OF LACTIC ACID: CPT | Performed by: EMERGENCY MEDICINE

## 2025-05-10 PROCEDURE — 84484 ASSAY OF TROPONIN QUANT: CPT | Performed by: EMERGENCY MEDICINE

## 2025-05-10 PROCEDURE — 93005 ELECTROCARDIOGRAM TRACING: CPT

## 2025-05-10 PROCEDURE — 71046 X-RAY EXAM CHEST 2 VIEWS: CPT | Performed by: RADIOLOGY

## 2025-05-10 PROCEDURE — 85025 COMPLETE CBC W/AUTO DIFF WBC: CPT | Performed by: EMERGENCY MEDICINE

## 2025-05-10 PROCEDURE — 36415 COLL VENOUS BLD VENIPUNCTURE: CPT | Performed by: EMERGENCY MEDICINE

## 2025-05-10 PROCEDURE — 71046 X-RAY EXAM CHEST 2 VIEWS: CPT

## 2025-05-10 PROCEDURE — 99285 EMERGENCY DEPT VISIT HI MDM: CPT | Mod: 25 | Performed by: EMERGENCY MEDICINE

## 2025-05-10 PROCEDURE — 81001 URINALYSIS AUTO W/SCOPE: CPT | Performed by: EMERGENCY MEDICINE

## 2025-05-10 PROCEDURE — 83880 ASSAY OF NATRIURETIC PEPTIDE: CPT | Performed by: EMERGENCY MEDICINE

## 2025-05-10 PROCEDURE — 84075 ASSAY ALKALINE PHOSPHATASE: CPT | Performed by: EMERGENCY MEDICINE

## 2025-05-10 PROCEDURE — 84443 ASSAY THYROID STIM HORMONE: CPT | Performed by: EMERGENCY MEDICINE

## 2025-05-10 PROCEDURE — 87502 INFLUENZA DNA AMP PROBE: CPT | Performed by: EMERGENCY MEDICINE

## 2025-05-10 ASSESSMENT — PAIN SCALES - GENERAL: PAINLEVEL_OUTOF10: 7

## 2025-05-10 ASSESSMENT — PAIN DESCRIPTION - DESCRIPTORS: DESCRIPTORS: SHARP

## 2025-05-10 ASSESSMENT — LIFESTYLE VARIABLES
EVER FELT BAD OR GUILTY ABOUT YOUR DRINKING: NO
HAVE YOU EVER FELT YOU SHOULD CUT DOWN ON YOUR DRINKING: NO
TOTAL SCORE: 0
HAVE PEOPLE ANNOYED YOU BY CRITICIZING YOUR DRINKING: NO
EVER HAD A DRINK FIRST THING IN THE MORNING TO STEADY YOUR NERVES TO GET RID OF A HANGOVER: NO

## 2025-05-10 NOTE — ANESTHESIA POSTPROCEDURE EVALUATION
Patient: Camden Lance    Procedure Summary       Date: 05/09/25 Room / Location: [facility] / [facility]    Anesthesia Start: 0719 Anesthesia Stop: 1000    Procedure: Ablation A-Fib Paroxysmal Diagnosis:       PAF (paroxysmal atrial fibrillation) (Multi)      (AF)    Providers: Juan Pablo Shell MD Responsible Provider: Juan Poe MD    Anesthesia Type: general ASA Status: 3            Anesthesia Type: general    Vitals Value Taken Time   /66 05/09/25 13:30   Temp 36 °C (96.8 °F) 05/09/25 13:30   Pulse 74 05/09/25 13:30   Resp 16 05/09/25 13:30   SpO2 96 % 05/09/25 13:30       Anesthesia Post Evaluation    Patient location during evaluation: PACU  Patient participation: complete - patient participated  Level of consciousness: awake and alert  Pain management: adequate  Airway patency: patent  Cardiovascular status: acceptable and hemodynamically stable  Respiratory status: acceptable, spontaneous ventilation and nonlabored ventilation  Hydration status: acceptable  Postoperative Nausea and Vomiting: none        There were no known notable events for this encounter.

## 2025-05-11 VITALS
OXYGEN SATURATION: 93 % | DIASTOLIC BLOOD PRESSURE: 73 MMHG | RESPIRATION RATE: 20 BRPM | WEIGHT: 250 LBS | HEART RATE: 56 BPM | HEIGHT: 75 IN | TEMPERATURE: 98.1 F | BODY MASS INDEX: 31.08 KG/M2 | SYSTOLIC BLOOD PRESSURE: 114 MMHG

## 2025-05-11 LAB
APPEARANCE UR: CLEAR
BILIRUB UR STRIP.AUTO-MCNC: NEGATIVE MG/DL
CARDIAC TROPONIN I PNL SERPL HS: 632 NG/L (ref 0–20)
COLOR UR: YELLOW
GLUCOSE UR STRIP.AUTO-MCNC: NORMAL MG/DL
HOLD SPECIMEN: NORMAL
KETONES UR STRIP.AUTO-MCNC: NEGATIVE MG/DL
LEUKOCYTE ESTERASE UR QL STRIP.AUTO: NEGATIVE
MUCOUS THREADS #/AREA URNS AUTO: ABNORMAL /LPF
NITRITE UR QL STRIP.AUTO: NEGATIVE
PH UR STRIP.AUTO: 5.5 [PH]
PROT UR STRIP.AUTO-MCNC: NEGATIVE MG/DL
RBC # UR STRIP.AUTO: ABNORMAL MG/DL
RBC #/AREA URNS AUTO: ABNORMAL /HPF
SP GR UR STRIP.AUTO: 1.03
UROBILINOGEN UR STRIP.AUTO-MCNC: ABNORMAL MG/DL
WBC #/AREA URNS AUTO: ABNORMAL /HPF

## 2025-05-11 PROCEDURE — 96365 THER/PROPH/DIAG IV INF INIT: CPT

## 2025-05-11 PROCEDURE — 96367 TX/PROPH/DG ADDL SEQ IV INF: CPT

## 2025-05-11 PROCEDURE — 2500000004 HC RX 250 GENERAL PHARMACY W/ HCPCS (ALT 636 FOR OP/ED): Mod: JZ | Performed by: EMERGENCY MEDICINE

## 2025-05-11 RX ORDER — CEFDINIR 300 MG/1
300 CAPSULE ORAL 2 TIMES DAILY
Qty: 14 CAPSULE | Refills: 0 | Status: SHIPPED | OUTPATIENT
Start: 2025-05-11 | End: 2025-05-18

## 2025-05-11 RX ORDER — DOXYCYCLINE 100 MG/1
100 TABLET ORAL 2 TIMES DAILY
Qty: 14 TABLET | Refills: 0 | Status: SHIPPED | OUTPATIENT
Start: 2025-05-11 | End: 2025-05-18

## 2025-05-11 RX ORDER — CEFTRIAXONE 2 G/50ML
2 INJECTION, SOLUTION INTRAVENOUS ONCE
Status: COMPLETED | OUTPATIENT
Start: 2025-05-11 | End: 2025-05-11

## 2025-05-11 RX ADMIN — CEFTRIAXONE 2 G: 2 INJECTION, SOLUTION INTRAVENOUS at 01:51

## 2025-05-11 RX ADMIN — DOXYCYCLINE 100 MG: 100 INJECTION, POWDER, LYOPHILIZED, FOR SOLUTION INTRAVENOUS at 02:33

## 2025-05-11 NOTE — ED PROVIDER NOTES
"Pt's wife called and reports that her  is having horrible memory issues and is very dizzy.   She reports he is sleeping all the time and she states he is getting worse \"right before her eyes'.  She also reports that he has a horrible odor about him.  Also reporting extreme fatigue.  Pt's wife very concerned. She states he keeps getting worse. Advised pt's wife to take pt to ER.  She verb understanding.     Update sent to Dr Mcgee.   " Camden Lance is a 64 y.o. patient presenting to the ED for fever, chest pain, shortness of breath.  The patient underwent cardiac ablation for A-fib yesterday morning.  Yesterday evening he generally felt unwell.  He does have pain in the center of his chest that is worse with deep inspiration.  He is mildly short of breath.  He has had a cough that is not productive.  He has some mild discomfort in his groin but states this is overall improving since yesterday morning.  He does have associated sore throat since the procedure as well.  He did take a COVID test at home which was negative.  He reports compliance with all of his prescribed medications.  He did have nausea on the way home from his procedure but has not had any nausea or vomiting since.  He has been eating and drinking without associated nausea, vomiting, diarrhea.    Additional History Obtained from: None  Limitations to History: None  ------------------------------------------------------------------------------------------------------------------------------------------  Physical Exam:  Appearance: Alert, cooperative.  Occasional dry cough during exam.  Skin: Warm, dry, appropriate color for ethnicity.  Eyes: Cornea clear. No scleral icterus or injection.   ENT: Mucous membranes moist.  Oropharynx mildly erythematous.  No posterior oropharyngeal asymmetry.  Uvula is midline.  Pulmonary: No accessory muscle use or stridor. Clear lung sounds bilaterally without rhonchi or wheezing.   Cardiac: Heart sounds regular without murmur. B/L radial pulses full and symmetric.   Abdomen: Soft, not tender.  No rebound or guarding.   Musculoskeletal: No gross deformities.   Neurological: Face symmetrical. Voice clear. Appropriately conversant.   Psychiatric: Appropriate mood and affect.    Medical Decision Making:  Chronic Medical Conditions Significantly Affecting Care:  has a past medical history of Asthma, Atrial fibrillation (Multi), Difficult intubation  (05/09/2025), Hypertension, Migraines, Pulmonary HTN (Multi), and Sleep apnea.    Social Determinants of Health Significantly Affecting Care: None identified    Differential Diagnosis Considered but not limited to: Patient's recently postop and malignant hyperthermia is a consideration however the patient's fever responded to Tylenol, he is not tachycardic or tachypneic, he does not have muscle rigidity.  This is clinically not suspected.  He does not have symptoms suggestive of pre-existing infection however viral cause independent of his procedure is a consideration.  Additionally the patient was intubated, pneumonia or urinary tract infection is a consideration.  The patient is anticoagulated, however this did have to be held briefly for the procedure.      External Records Reviewed:   I reviewed recent and relevant outside records including:     Independent Interpretation of Studies: The following studies were ordered as part of the emergency department work up and independently interpreted by me. See ED Course for details.    ED Course as of 05/10/25 2335   Sat May 10, 2025   2304 EKG performed at 2304 and interpreted by me shows sinus rhythm at a rate of 53.  Intervals are normal.  The axis is normal.  There are no ST or T wave changes.  No STEMI. [SP]   2333 CBC is within normal limits.  Chemistry with potassium of 3.4 and otherwise normal.  Lactate is normal. [SP]      ED Course User Index  [SP] Alexandra Pereyra DO         Escalation of Care:        Discussion of Management with Other Providers:   I discussed the patient/results with:     ultrasound to rule out effusion.    Point care ultrasound was performed and does not show pericardial effusion.  I discussed results with the patient including my concern for pneumonia in discussion with Dr. Mustafa.  He is comfortable with discharge.  Follow-up and return precautions were given.       Alexandra Pereyra DO  05/23/25 4155

## 2025-05-12 ENCOUNTER — TELEPHONE (OUTPATIENT)
Dept: CARDIOLOGY | Facility: HOSPITAL | Age: 65
End: 2025-05-12
Payer: COMMERCIAL

## 2025-05-13 ENCOUNTER — OFFICE VISIT (OUTPATIENT)
Dept: CARDIOLOGY | Facility: HOSPITAL | Age: 65
End: 2025-05-13
Payer: COMMERCIAL

## 2025-05-13 VITALS
DIASTOLIC BLOOD PRESSURE: 80 MMHG | SYSTOLIC BLOOD PRESSURE: 140 MMHG | WEIGHT: 252 LBS | BODY MASS INDEX: 31.33 KG/M2 | OXYGEN SATURATION: 95 % | HEIGHT: 75 IN | HEART RATE: 68 BPM

## 2025-05-13 DIAGNOSIS — I48.0 PAF (PAROXYSMAL ATRIAL FIBRILLATION) (MULTI): ICD-10-CM

## 2025-05-13 LAB
ATRIAL RATE: 53 BPM
ATRIAL RATE: 68 BPM
P AXIS: 21 DEGREES
P AXIS: 52 DEGREES
P OFFSET: 177 MS
P ONSET: 123 MS
PR INTERVAL: 154 MS
PR INTERVAL: 172 MS
Q ONSET: 209 MS
Q ONSET: 253 MS
QRS COUNT: 11 BEATS
QRS COUNT: 8 BEATS
QRS DURATION: 100 MS
QRS DURATION: 111 MS
QT INTERVAL: 418 MS
QT INTERVAL: 433 MS
QTC CALCULATION(BAZETT): 407 MS
QTC CALCULATION(BAZETT): 444 MS
QTC FREDERICIA: 415 MS
QTC FREDERICIA: 436 MS
R AXIS: 20 DEGREES
R AXIS: 23 DEGREES
T AXIS: 44 DEGREES
T AXIS: 57 DEGREES
T OFFSET: 418 MS
T OFFSET: 469 MS
VENTRICULAR RATE: 53 BPM
VENTRICULAR RATE: 68 BPM

## 2025-05-13 PROCEDURE — 3008F BODY MASS INDEX DOCD: CPT | Performed by: INTERNAL MEDICINE

## 2025-05-13 PROCEDURE — 1036F TOBACCO NON-USER: CPT | Performed by: INTERNAL MEDICINE

## 2025-05-13 PROCEDURE — 99213 OFFICE O/P EST LOW 20 MIN: CPT | Performed by: INTERNAL MEDICINE

## 2025-05-13 PROCEDURE — 99213 OFFICE O/P EST LOW 20 MIN: CPT | Mod: 25 | Performed by: INTERNAL MEDICINE

## 2025-05-13 PROCEDURE — 93010 ELECTROCARDIOGRAM REPORT: CPT | Performed by: INTERNAL MEDICINE

## 2025-05-13 PROCEDURE — 93005 ELECTROCARDIOGRAM TRACING: CPT | Performed by: INTERNAL MEDICINE

## 2025-05-13 RX ORDER — FLECAINIDE ACETATE 100 MG/1
100 TABLET ORAL 2 TIMES DAILY
Qty: 60 TABLET | Refills: 11 | Status: SHIPPED | OUTPATIENT
Start: 2025-05-13 | End: 2026-05-13

## 2025-05-13 NOTE — LETTER
May 13, 2025     Patient: Camden Lance   YOB: 1960   Date of Visit: 5/13/2025       To Whom It May Concern:    It is my medical opinion that Camden Lance  can return to work 5/19/2025    If you have any questions or concerns, please don't hesitate to call.         Sincerely,        Ramiro Neal MD    CC: No Recipients

## 2025-05-13 NOTE — PATIENT INSTRUCTIONS
Continue all current medications as prescribed.   A return to work letter has been provided to you today.  Followup with Rosenda Gregory NP, in 6 months.      If you have any questions or cardiac concerns, please call our office at [phone].

## 2025-05-13 NOTE — PROGRESS NOTES
Counseling:  The patient was counseled regarding diagnostic results, instructions for management, risk factor reductions, prognosis, patient and family education, impressions, risks and benefits of treatment options and importance of compliance with treatment.      Chief Complaint:  The patient presents today for 4-month followup of a-fib s/p RFA.     History Of Present Illness:    Camden Lance is a 64 y.o. male patient who presents today for 4-month followup of a-fib s/p RFA. His PMH is significant for asthma, pulmonary arterial hypertension, lung nodule and LOI. On 05/09/2025, the patient underwent RFA for a-fib. On 05/10/2025, the patient presented to the ED with a chief complaint of chest pain, SOB and fever. While in the ED, CXR revealed some patchy infiltrate or atelectasis left lung base with probable small effusion. Today, the patient reports persistent, although improved pleuritic chest discomfort. He is currently on antibiotic therapy. EKG today shows that the patient is maintaining NSR. The patient is compliant with his prescribed medications.     Past Surgical History:  He has a past surgical history that includes Appendectomy; Hernia repair; Cholecystectomy; Colonoscopy; and Cardiac electrophysiology procedure (N/A, 5/9/2025).      Social History:  He reports that he has never smoked. He has never been exposed to tobacco smoke. He has never used smokeless tobacco. He reports that he does not currently use alcohol. He reports that he does not currently use drugs.    Family History:  No family history on file.     Allergies:  Amoxicillin and Penicillins    Outpatient Medications:  Current Outpatient Medications   Medication Instructions    albuterol 90 mcg/actuation inhaler Inhale.    albuterol 2.5 mg    apixaban (ELIQUIS) 5 mg, oral, 2 times daily    cefdinir (OMNICEF) 300 mg, oral, 2 times daily    doxycycline (ADOXA) 100 mg, oral, 2 times daily, Take with a full glass of water and do not lie down  "for at least 30 minutes after    flecainide (TAMBOCOR) 100 mg, oral, 2 times daily    fluticasone furoate-vilanteroL (Breo Ellipta) 200-25 mcg/dose inhaler Inhale.    omeprazole (PriLOSEC) 40 mg DR capsule Take 1 capsule (40 mg) by mouth 2 times a day before meals for 30 days, THEN 1 capsule (40 mg) once daily in the morning. Take before meals. Do not crush or chew. Do not fill before May 10, 2025.        Last Recorded Vitals:  Vitals:    05/13/25 1509   BP: 140/80   BP Location: Left arm   Patient Position: Sitting   BP Cuff Size: Adult   Pulse: 68   SpO2: 95%   Weight: 114 kg (252 lb)   Height: 1.905 m (6' 3\")             Review of Systems   All other systems reviewed and are negative.     Physical Exam:  Constitutional:       Appearance: Healthy appearance. Not in distress.   Neck:      Vascular: No JVR. JVD normal.   Pulmonary:      Effort: Pulmonary effort is normal.      Breath sounds: Normal breath sounds. No wheezing. No rhonchi. No rales.   Chest:      Chest wall: Not tender to palpatation.   Cardiovascular:      PMI at left midclavicular line. Normal rate. Regular rhythm. Normal S1. Normal S2.       Murmurs: There is no murmur.      No gallop.  No click. No rub.   Pulses:     Intact distal pulses.   Edema:     Peripheral edema absent.   Abdominal:      General: Bowel sounds are normal.      Palpations: Abdomen is soft.      Tenderness: There is no abdominal tenderness.   Musculoskeletal: Normal range of motion.         General: No tenderness. Skin:     General: Skin is warm and dry.   Neurological:      General: No focal deficit present.      Mental Status: Alert and oriented to person, place and time.       Last Cardiology Tests:  01/10/2025 - CTA Coronary Arteries  Normal coronary anatomy without evidence of atherosclerotic changes or stenotic disease.    10/14/2024 to 10/27/2024 - Holter Monitor  1. Predominant underlying rhythm was sinus rhythm; min HR 44 bpm, max  bpm, avg HR 74 bpm.  2. Slight " P wave morphology changes were noted  3. 3 ventricular tachycardia runs occurred; fastest interval 4 beats with max rate 160 bpm, longest lasting 14 beats with avg rate 110 bpm.  4. 189 supraventricular tachycardia runs occurred; fastest interval lasting 10 beats with max rate 171 bpm, longest lasting 14.9 secs with avg rate 119 bpm.  4. Isolated SVEs were frequent, SVE couplets were frequent, and SVE triplets were occasional.  5. Isolated VEs were rare, VE couplets were rare, and no VE triplets were present.  6. Ventricular trigeminy was present.    04/12/2024 - Exercise Stress Test  1. ECG: Resting ECG demonstrates normal sinus rhythm with premature atrial complexes.   2. ECG: The stress ECG was negative for ischemia. There were frequent PACs and occasional PVCs.   3. NORMAL TREADMILL STRESS TEST BASED ON ST-SEGMENT     08/30/2022 - Cardiac MRI  1. Normal LV size (EDVi  93ml/m2) with low-normal systolic function (LVEF 53%).  Focal hypokinesis in the basal inferolateral segment.   2. Quantitatively normal RV size (EDVi 91ml/m2) with normal systolic function (RVEF 54%).   3. Multi-focal mid-myocardial late gadolinium enhancement involving the basal lateral segment which likely represent prior myocarditis; however, sarcoidosis remains in the differential diagnosis.    05/17/2022 - TTE  1. Normal LVEF of 56%.  2. Borderline left ventricular hypertrophy.   3. The left ventricle is borderline dilated.  4. Right ventricular cavity is borderline dilated.   5. Trivial tricuspid regurgitation.  6. Moderate pulmonary hypertension.  7. The atrial septum is possibly intact but not seen well enough to rule out an atrial septal defect or PFO.  8. There is trace pericardial effusion but no tamponade.   9. The aortic root is borderline dilated.      Lab review: I have personally reviewed the laboratory result(s).  Diagnostic review: I have personally reviewed the result(s) of the CXR.     Assessment/Plan   1) Atrial Fibrillation  "  On Eliquis 5 mg BID, flecainide 100 mg BID  Evaluated by Dr. Chong as well as Dr. Banks and was told that he had a-fib   Indicates that Dr. Chong noted an \"erratic\" rhythm, although difficult to interpret - ? Baseline artifact   When evaluated in [place] ED, he was found to be in NSR  Exercise stress test 04/12/2024 showed SR with PVCs/PACs  FH negative for a-fib  Personal h/o LOI - intolerant of CPAP  Holter with 3 runs of v-tach, 189 runs of SVT, frequent isolated SVEs and SVE couplets, occasional SVE triplets, and rare isolated VEs and VE couplets.   Upon personal review of monitor results, episodes of v-tach appear to be atrial tachycardia, and episodes of SVT appear to be intermittent atrial fibrillation.  Now s/p RFA 05/09/2025  ED evaluation 05/10/2025 with CP, SOB and fever   CXR showed some patchy infiltrate or atelectasis left lung base with probable small effusion  Reports persistent, although improved pleuritic chest discomfort  In NSR by EKG today   Continue current medical Rx   Followup with Rosenda Gregory NP, in 6 months    2) Chest Pain  CTA coronary arteries 01/10/2025 total CT calcium score of 0, normal coronary anatomy without evidence of atherosclerotic changes or stenotic disease.  ED evaluation 05/10/2025 with CP, SOB and fever following RFA the day prior  CXR showed some patchy infiltrate or atelectasis left lung base with probable small effusion  Reports persistent, although improved pleuritic chest discomfort    3) Hepatic Cyst  CT coronary arteries 01/10/2025 with segment 4 hepatic cyst  Recommend followup with PCP      Scribe Attestation  By signing my name below, I, Tee Rothman   attest that this documentation has been prepared under the direction and in the presence of Ramiro Neal MD.   "

## 2025-05-15 DIAGNOSIS — I48.0 PAF (PAROXYSMAL ATRIAL FIBRILLATION) (MULTI): Primary | ICD-10-CM

## 2025-05-15 NOTE — PROGRESS NOTES
Patient reports experiencing numbness and tingling in his hands and feet, which he believes may be related to Eliquis. Per Dr. Shell, the patient may trial a switch to Xarelto to see if symptoms improve. Prescription has been sent.

## 2025-05-21 ENCOUNTER — HOSPITAL ENCOUNTER (OUTPATIENT)
Dept: NEUROLOGY | Facility: HOSPITAL | Age: 65
Discharge: HOME | End: 2025-05-21
Payer: COMMERCIAL

## 2025-05-21 DIAGNOSIS — R20.2 PARESTHESIAS: ICD-10-CM

## 2025-05-21 PROCEDURE — 95886 MUSC TEST DONE W/N TEST COMP: CPT | Performed by: STUDENT IN AN ORGANIZED HEALTH CARE EDUCATION/TRAINING PROGRAM

## 2025-05-21 PROCEDURE — 95912 NRV CNDJ TEST 11-12 STUDIES: CPT | Performed by: STUDENT IN AN ORGANIZED HEALTH CARE EDUCATION/TRAINING PROGRAM

## 2025-05-29 LAB
ATRIAL RATE: 70 BPM
P AXIS: 72 DEGREES
P OFFSET: 175 MS
P ONSET: 131 MS
PR INTERVAL: 160 MS
Q ONSET: 211 MS
QRS COUNT: 11 BEATS
QRS DURATION: 96 MS
QT INTERVAL: 406 MS
QTC CALCULATION(BAZETT): 438 MS
QTC FREDERICIA: 427 MS
R AXIS: 29 DEGREES
T AXIS: 39 DEGREES
T OFFSET: 414 MS
VENTRICULAR RATE: 70 BPM

## 2025-06-19 NOTE — PROGRESS NOTES
Electrophysiology Follow up Visit    History of Present Illness:  Camden Lance is a 65 y.o. year old male patient with history of atrial fibrillation, asthma, LOI, and cervical spine disease.    Patient was referred to electrophysiology by Dr Neal for atrial fibrillation management. He has had frequent ectopy in the past and been diagnosed at [facility] facilities as having paroxysmal atrial fibrillation, though other physicians have reported that he simply has very frequent extrasystoles.  He underwent a 14-day cardiac event monitor in October 2024, showing a 16.5% PAC burden and some runs of atrial tachycardia often for 5 to 10 seconds at a time that conceivably could have been true fibrillation.  He was placed on Eliquis anticoagulation for prevention of cardioembolic events.  He has not been treated with beta-blockers other than a few doses prior to a CT scan.  He has not been on antiarrhythmic drugs. The patient has undergone additional cardiac workup, with an LVEF around 55% by multiple studies, and a coronary calcium score of 0.    Patient was evaluated by Dr. Alves in February 2025. Paroxysmal atrial arrhythmias, mainly very frequent premature atrial complexes and perhaps some runs of true paroxysmal atrial fibrillation.  The patient may have a pulmonary venous focus for his frequent ectopy.  The etiology of these rhythms is unclear, perhaps related to asthma or obstructive sleep apnea.  The patient technically has a [id number]-MURr score of 0, but he is currently on Eliquis anticoagulation.  He has not been tried on specific antiarrhythmic therapy.  Patient started on flecainide to suppress atrial ectopy.  He was referred to Dr. Shell for consideration of pulmonary vein isolation.    5/9/2025 successful RFA pulmonary vein isolation per Dr. Shell    Patient here for follow-up for atrial arrhythmias.  Patient has felt well since his ablation last month.  He noted brief palpitations initially. He  is compliant with daily medications and denies any bleeding concerns on OAC.    Medical History:  He has a past medical history of Asthma, Atrial fibrillation (Multi), Difficult intubation (05/09/2025), Hypertension, Migraines, Pulmonary HTN (Multi), and Sleep apnea.    Surgical History:  He has a past surgical history that includes Appendectomy; Hernia repair; Cholecystectomy; Colonoscopy; and Cardiac electrophysiology procedure (N/A, 5/9/2025).     Social History:  He reports that he has never smoked. He has never been exposed to tobacco smoke. He has never used smokeless tobacco. He reports that he does not currently use alcohol. He reports that he does not currently use drugs.       Family History[1]     ROS:  A 14 point review of systems was done and is negative other than as stated in HPI    Physical Exam  Constitutional:       Appearance: Normal appearance.   Eyes:      Pupils: Pupils are equal, round, and reactive to light.   Cardiovascular:      Rate and Rhythm: Normal rate and regular rhythm.      Heart sounds: Normal heart sounds.   Pulmonary:      Effort: Pulmonary effort is normal.      Breath sounds: Normal breath sounds.   Musculoskeletal:         General: Normal range of motion.   Skin:     General: Skin is warm and dry.   Neurological:      Mental Status: He is alert and oriented to person, place, and time.       Allergies:  Amoxicillin and Penicillins    Outpatient Medications:  Current Outpatient Medications   Medication Instructions    albuterol 90 mcg/actuation inhaler Inhale.    albuterol 2.5 mg    flecainide (TAMBOCOR) 100 mg, oral, 2 times daily    fluticasone furoate-vilanteroL (Breo Ellipta) 200-25 mcg/dose inhaler Inhale.    omeprazole (PriLOSEC) 40 mg DR capsule Take 1 capsule (40 mg) by mouth 2 times a day before meals for 30 days, THEN 1 capsule (40 mg) once daily in the morning. Take before meals. Do not crush or chew. Do not fill before May 10, 2025.    rivaroxaban (XARELTO) 20 mg,  "oral, Daily with evening meal, Take with food.      Reviewed Labs:  CBC  Lab Results   Component Value Date    WBC 9.1 05/10/2025    WBC 8.1 04/19/2025    HGB 14.6 05/10/2025    HGB 16.6 04/19/2025    HCT 44.8 05/10/2025    HCT 49.1 04/19/2025    MCV 88 05/10/2025    MCV 88.9 04/19/2025     05/10/2025     04/19/2025      Renal Function Panel  Lab Results   Component Value Date    GLUCOSE 117 (H) 05/10/2025    GLUCOSE 72 04/19/2025     05/10/2025     04/19/2025    K 3.4 (L) 05/10/2025    K 4.2 04/19/2025     05/10/2025     04/19/2025    CO2 24 05/10/2025    CO2 26 04/19/2025    ANIONGAP 10 05/10/2025    ANIONGAP 11 04/19/2025    BUN 12 05/10/2025    BUN 15 04/19/2025    CREATININE 0.81 05/10/2025    CREATININE 0.85 04/19/2025    CALCIUM 8.8 05/10/2025    CALCIUM 9.3 04/19/2025      CMP  Lab Results   Component Value Date    CALCIUM 8.8 05/10/2025    CALCIUM 9.3 04/19/2025    PROT 5.9 (L) 05/10/2025    ALBUMIN 3.7 05/10/2025    AST 15 05/10/2025    ALT 13 05/10/2025    ALKPHOS 58 05/10/2025    BILITOT 0.5 05/10/2025      Mg   Lab Results   Component Value Date    MG 1.97 02/11/2025      Thyroid  Lab Results   Component Value Date    TSH 1.02 05/10/2025      [id number]-Hggn score    Stroke, TIA or thromboembolism - No, 0 points, Hypertension - No, 0 points, Diabetes - No, 0 points, Congestive Heart Failure  or Left ventricular Dysfunction - No, 0 points, Vascular Disease - No, 0 points, Female - No, 0 points, and Age - less than 65 years - 0 points  [id number] score is 0.     Visit Vitals  /78   Pulse 86   Ht 1.905 m (6' 3\")   Wt 114 kg (252 lb)   BMI 31.50 kg/m²   Smoking Status Never   BSA 2.46 m²        Cardiac Testing Reviewed Study(s):  ECGs (reviewed and my interpretation):   6/20/2025 sinus rhythm with rate of 86 bpm with PACs    Assessment  Atrial fibrillation:  Patient noted to have frequent ectopy and diagnosed with atrial fibrillation per [facility]  October 2024 monitor " showed 16.5% PAC burden with runs of atrial tach/fibrillation  Prior echocardiogram showed LVEF around 55%  Evaluated by Dr. Alves February 2025 and started on flecainide for atrial arrhythmias  [id number] score 0, continued on OAC pre-/post procedure  5/9/2025 RFA pulmonary vein isolation per Dr. Shell    ECG today personally reviewed which shows sinus rhythm with PACs.  Patient maintaining sinus rhythm since his ablation. We reviewed lifestyle modifications to reduce risk of recurrence of atrial fibrillation. He will follow up with sleep medicine regarding LOI as he doesn't tolerate CPAP. We will continue flecainide and plan to stop after blanking period. We will also plan to stop OAC as his VYR8IB0-Bhrq score is 0. We will follow up in 2-3 months.    LOI:  Unable to tolerate CPAP  Followed up with sleep medicine in the past for repeat sleep study which was not completed    Encourage patient to follow-up with sleep medicine for further discussion regarding possible inspire    Plan  Continue flecainide 100mg twice daily  Continue Xarelto  Follow up with sleep medicine  Follow up in 2-3 months      Exclusive of any other services or procedures performed, Farideh LAO, APRN-CNP , spent 30 minutes in duration for this visit today.  This time consisted of chart review, obtaining history, and/or performing the exam as documented above as well as documenting the clinical information for the encounter in the electronic record, discussing treatment options, plans, and/or goals with patient, family, and/or caregiver, refilling medications, updating the electronic record, ordering medicines, lab work, imaging, referrals, and/or procedures as documented above and communicating with other [facility] professionals. I have discussed the results of laboratory, radiology, and cardiology studies with the patient and their family/caregiver.          [1] No family history on file.

## 2025-06-20 ENCOUNTER — OFFICE VISIT (OUTPATIENT)
Dept: CARDIOLOGY | Facility: HOSPITAL | Age: 65
End: 2025-06-20
Payer: COMMERCIAL

## 2025-06-20 VITALS
HEART RATE: 86 BPM | HEIGHT: 75 IN | WEIGHT: 252 LBS | BODY MASS INDEX: 31.33 KG/M2 | SYSTOLIC BLOOD PRESSURE: 122 MMHG | DIASTOLIC BLOOD PRESSURE: 78 MMHG

## 2025-06-20 DIAGNOSIS — Z79.01 CURRENT USE OF LONG TERM ANTICOAGULATION: ICD-10-CM

## 2025-06-20 DIAGNOSIS — I48.0 PAF (PAROXYSMAL ATRIAL FIBRILLATION) (MULTI): Primary | ICD-10-CM

## 2025-06-20 PROCEDURE — 99214 OFFICE O/P EST MOD 30 MIN: CPT | Performed by: NURSE PRACTITIONER

## 2025-06-20 PROCEDURE — 99213 OFFICE O/P EST LOW 20 MIN: CPT | Performed by: NURSE PRACTITIONER

## 2025-06-20 PROCEDURE — 93005 ELECTROCARDIOGRAM TRACING: CPT | Performed by: NURSE PRACTITIONER

## 2025-06-20 PROCEDURE — 3008F BODY MASS INDEX DOCD: CPT | Performed by: NURSE PRACTITIONER

## 2025-06-20 PROCEDURE — 1036F TOBACCO NON-USER: CPT | Performed by: NURSE PRACTITIONER

## 2025-06-20 PROCEDURE — 93010 ELECTROCARDIOGRAM REPORT: CPT | Performed by: INTERNAL MEDICINE

## 2025-06-20 NOTE — PATIENT INSTRUCTIONS
Continue flecainide 100mg twice daily  Continue Xarelto  Follow up with sleep medicine  Follow up in 2-3 months

## 2025-06-22 LAB
ATRIAL RATE: 98 BPM
P AXIS: 0 DEGREES
P OFFSET: 163 MS
P ONSET: 110 MS
PR INTERVAL: 196 MS
Q ONSET: 208 MS
QRS COUNT: 14 BEATS
QRS DURATION: 100 MS
QT INTERVAL: 378 MS
QTC CALCULATION(BAZETT): 452 MS
QTC FREDERICIA: 426 MS
R AXIS: 129 DEGREES
T AXIS: 29 DEGREES
T OFFSET: 397 MS
VENTRICULAR RATE: 86 BPM

## 2025-07-13 NOTE — PROGRESS NOTES
Counseling:  The patient was counseled regarding diagnostic results, instructions for management, risk factor reductions, prognosis, patient and family education, impressions, risks and benefits of treatment options and importance of compliance with treatment.      Chief Complaint:  The patient presents today for 4-month followup of a-fib s/p RFA.     History Of Present Illness:    Camden Lance is a 65 y.o. male patient who presents today for 4-month followup of a-fib s/p RFA. His PMH is significant for asthma, pulmonary arterial hypertension, lung nodule and LOI. On 05/09/2025, the patient underwent RFA for a-fib. On 05/10/2025, the patient presented to the ED with a chief complaint of chest pain, SOB and fever. While in the ED, CXR revealed some patchy infiltrate or atelectasis left lung base with probable small effusion. Today, the patient reports persistent, although improved pleuritic chest discomfort. He is currently on antibiotic therapy. EKG today shows that the patient is maintaining NSR. The patient is compliant with his prescribed medications.     Past Surgical History:  He has a past surgical history that includes Appendectomy; Hernia repair; Cholecystectomy; Colonoscopy; and Cardiac electrophysiology procedure (N/A, 5/9/2025).      Social History:  He reports that he has never smoked. He has never been exposed to tobacco smoke. He has never used smokeless tobacco. He reports that he does not currently use alcohol. He reports that he does not currently use drugs.    Family History:  No family history on file.     Allergies:  Amoxicillin and Penicillins    Outpatient Medications:  Current Outpatient Medications   Medication Instructions    albuterol 90 mcg/actuation inhaler Inhale.    albuterol 2.5 mg    flecainide (TAMBOCOR) 100 mg, oral, 2 times daily    fluticasone furoate-vilanteroL (Breo Ellipta) 200-25 mcg/dose inhaler Inhale.    omeprazole (PriLOSEC) 40 mg DR capsule Take 1 capsule (40 mg) by  mouth 2 times a day before meals for 30 days, THEN 1 capsule (40 mg) once daily in the morning. Take before meals. Do not crush or chew. Do not fill before May 10, 2025.    rivaroxaban (XARELTO) 20 mg, oral, Daily with evening meal, Take with food.        Last Recorded Vitals:  There were no vitals filed for this visit.            Review of Systems   All other systems reviewed and are negative.     Physical Exam:  Constitutional:       Appearance: Healthy appearance. Not in distress.   Neck:      Vascular: No JVR. JVD normal.   Pulmonary:      Effort: Pulmonary effort is normal.      Breath sounds: Normal breath sounds. No wheezing. No rhonchi. No rales.   Chest:      Chest wall: Not tender to palpatation.   Cardiovascular:      PMI at left midclavicular line. Normal rate. Regular rhythm. Normal S1. Normal S2.       Murmurs: There is no murmur.      No gallop.  No click. No rub.   Pulses:     Intact distal pulses.   Edema:     Peripheral edema absent.   Abdominal:      General: Bowel sounds are normal.      Palpations: Abdomen is soft.      Tenderness: There is no abdominal tenderness.   Musculoskeletal: Normal range of motion.         General: No tenderness. Skin:     General: Skin is warm and dry.   Neurological:      General: No focal deficit present.      Mental Status: Alert and oriented to person, place and time.       Last Cardiology Tests:  01/10/2025 - CTA Coronary Arteries  Normal coronary anatomy without evidence of atherosclerotic changes or stenotic disease.    10/14/2024 to 10/27/2024 - Holter Monitor  1. Predominant underlying rhythm was sinus rhythm; min HR 44 bpm, max  bpm, avg HR 74 bpm.  2. Slight P wave morphology changes were noted  3. 3 ventricular tachycardia runs occurred; fastest interval 4 beats with max rate 160 bpm, longest lasting 14 beats with avg rate 110 bpm.  4. 189 supraventricular tachycardia runs occurred; fastest interval lasting 10 beats with max rate 171 bpm, longest  "lasting 14.9 secs with avg rate 119 bpm.  4. Isolated SVEs were frequent, SVE couplets were frequent, and SVE triplets were occasional.  5. Isolated VEs were rare, VE couplets were rare, and no VE triplets were present.  6. Ventricular trigeminy was present.    04/12/2024 - Exercise Stress Test  1. ECG: Resting ECG demonstrates normal sinus rhythm with premature atrial complexes.   2. ECG: The stress ECG was negative for ischemia. There were frequent PACs and occasional PVCs.   3. NORMAL TREADMILL STRESS TEST BASED ON ST-SEGMENT     08/30/2022 - Cardiac MRI  1. Normal LV size (EDVi  93ml/m2) with low-normal systolic function (LVEF 53%).  Focal hypokinesis in the basal inferolateral segment.   2. Quantitatively normal RV size (EDVi 91ml/m2) with normal systolic function (RVEF 54%).   3. Multi-focal mid-myocardial late gadolinium enhancement involving the basal lateral segment which likely represent prior myocarditis; however, sarcoidosis remains in the differential diagnosis.    05/17/2022 - TTE  1. Normal LVEF of 56%.  2. Borderline left ventricular hypertrophy.   3. The left ventricle is borderline dilated.  4. Right ventricular cavity is borderline dilated.   5. Trivial tricuspid regurgitation.  6. Moderate pulmonary hypertension.  7. The atrial septum is possibly intact but not seen well enough to rule out an atrial septal defect or PFO.  8. There is trace pericardial effusion but no tamponade.   9. The aortic root is borderline dilated.      Lab review: I have personally reviewed the laboratory result(s).  Diagnostic review: I have personally reviewed the result(s) of the CXR.     Assessment/Plan   1) Atrial Fibrillation   On Eliquis 5 mg BID, flecainide 100 mg BID  Evaluated by Dr. Chong as well as Dr. Banks and was told that he had a-fib   Indicates that Dr. Chong noted an \"erratic\" rhythm, although difficult to interpret - ? Baseline artifact   When evaluated in [place] ED, he was found to be in " NSR  Exercise stress test 04/12/2024 showed SR with PVCs/PACs  FH negative for a-fib  Personal h/o LOI - intolerant of CPAP  Holter with 3 runs of v-tach, 189 runs of SVT, frequent isolated SVEs and SVE couplets, occasional SVE triplets, and rare isolated VEs and VE couplets.   Upon personal review of monitor results, episodes of v-tach appear to be atrial tachycardia, and episodes of SVT appear to be intermittent atrial fibrillation.  Now s/p RFA 05/09/2025  ED evaluation 05/10/2025 with CP, SOB and fever   CXR showed some patchy infiltrate or atelectasis left lung base with probable small effusion  Reports persistent, although improved pleuritic chest discomfort  In NSR by EKG today   Continue current medical Rx   Followup with Rosenda Gregory NP, in 6 months    2) Chest Pain  CTA coronary arteries 01/10/2025 total CT calcium score of 0, normal coronary anatomy without evidence of atherosclerotic changes or stenotic disease.  ED evaluation 05/10/2025 with CP, SOB and fever following RFA the day prior  CXR showed some patchy infiltrate or atelectasis left lung base with probable small effusion  Reports persistent, although improved pleuritic chest discomfort    3) Hepatic Cyst  CT coronary arteries 01/10/2025 with segment 4 hepatic cyst  Recommend followup with PCP      Scribe Attestation  By signing my name below, I, Tee Rothman   attest that this documentation has been prepared under the direction and in the presence of Ramiro Neal MD.

## 2025-07-14 ENCOUNTER — TELEPHONE (OUTPATIENT)
Dept: CARDIOLOGY | Facility: HOSPITAL | Age: 65
End: 2025-07-14
Payer: COMMERCIAL

## 2025-07-14 ENCOUNTER — APPOINTMENT (OUTPATIENT)
Dept: CARDIOLOGY | Facility: HOSPITAL | Age: 65
End: 2025-07-14
Payer: COMMERCIAL

## 2025-07-14 RX ORDER — DIAZEPAM 10 MG/1
TABLET ORAL
COMMUNITY
Start: 2025-05-21

## 2025-07-14 NOTE — TELEPHONE ENCOUNTER
Called patient at this time to see if he needed to come in today as he is accurately scheduled with  in Nov.  Patient expressed no.    Canceled appointment for the day.     Thank you!  Kareem REBOLLEDO

## 2025-08-11 ENCOUNTER — HOSPITAL ENCOUNTER (EMERGENCY)
Age: 65
Discharge: HOME | End: 2025-08-11
Payer: COMMERCIAL

## 2025-08-11 VITALS
OXYGEN SATURATION: 96 % | BODY MASS INDEX: 32.12 KG/M2 | WEIGHT: 257 LBS | RESPIRATION RATE: 18 BRPM | HEART RATE: 56 BPM | TEMPERATURE: 98.4 F | SYSTOLIC BLOOD PRESSURE: 118 MMHG | DIASTOLIC BLOOD PRESSURE: 95 MMHG

## 2025-08-11 DIAGNOSIS — R21 RASH: Primary | ICD-10-CM

## 2025-08-11 PROCEDURE — 99211 OFF/OP EST MAY X REQ PHY/QHP: CPT

## 2025-08-11 RX ORDER — RIVAROXABAN 10 MG/1
10 TABLET, FILM COATED ORAL
COMMUNITY

## 2025-08-11 RX ORDER — CEPHALEXIN 500 MG/1
500 CAPSULE ORAL 2 TIMES DAILY
Qty: 20 CAPSULE | Refills: 0 | Status: SHIPPED | OUTPATIENT
Start: 2025-08-11 | End: 2025-08-21

## (undated) DEVICE — TRAY EPI SGL DOSE 18GA NDL CUST AULTMAN HOSP

## (undated) DEVICE — CATHETER, PENTARAY, NAV ECO, 7FR, 2-6-2 SPACING, F CURVE

## (undated) DEVICE — INTRODUCER, HEMOSTASIS, STR/J .038 IN, 8.5FR 12CM

## (undated) DEVICE — GLOVE ORANGE PI 8   MSG9080

## (undated) DEVICE — CATHETER, EPL, 7FR DUO, 2/8 2M 95CM, STEERABLE LGCRL

## (undated) DEVICE — NON-DEHP CATHETER EXTENSION SET, MALE LUER LOCK ADAPTER

## (undated) DEVICE — SHEATH, STEERABLE, SUREFLEX, MEDIUM CURVE

## (undated) DEVICE — CLOSURE SYSTEM, VASCULAR, MVP 6-12FR, VENOUS

## (undated) DEVICE — 3M™ RED DOT™ MONITORING ELECTRODE WITH FOAM TAPE AND STICKY GEL 2560, 50/BAG, 20/CASE, 72/PLT: Brand: RED DOT™

## (undated) DEVICE — CATHETER, THERMOCOOL SMART TOUCH, SF, D-F CURVE

## (undated) DEVICE — GAUZE,SPONGE,4"X4",12PLY,STERILE,LF,2'S: Brand: MEDLINE

## (undated) DEVICE — Z DISCONTINUED APPLICATOR SURG PREP 0.35OZ 2% CHG 70% ISO ALC W/ HI LT

## (undated) DEVICE — CATHETER, DIAGNOSTIC, SOUNDSTAR ECO SMS, 8FR

## (undated) DEVICE — 3 ML SYRINGE LUER-LOCK TIP: Brand: MONOJECT

## (undated) DEVICE — BANDAGE ADH W1XL3IN NAT FAB WVN FLX DURABLE N ADH PD SEAL

## (undated) DEVICE — NEEDLE, NRG TRANSSEPTAL, 98CM, CURVE C0

## (undated) DEVICE — NEEDLE HYPO 18GA L1.5IN PNK POLYPR HUB S STL THN WALL FILL

## (undated) DEVICE — TUBING SET, IRRIGATION, SMARTABLATE

## (undated) DEVICE — PATCHES, EXTERNAL REFERENCE, CARTO3

## (undated) DEVICE — NEEDLE HYPO 25GA L1.5IN BLU POLYPR HUB S STL REG BVL STR

## (undated) DEVICE — 12 ML SYRINGE,LUER-LOCK TIP: Brand: MONOJECT

## (undated) DEVICE — Device: Brand: PORTEX

## (undated) DEVICE — INTRODUCER, SHEATH, FAST-CATH, 8FR X 12CM, C-LOCK

## (undated) DEVICE — 6 ML SYRINGE LUER-LOCK TIP: Brand: MONOJECT